# Patient Record
Sex: MALE | Race: OTHER | Employment: OTHER | URBAN - METROPOLITAN AREA
[De-identification: names, ages, dates, MRNs, and addresses within clinical notes are randomized per-mention and may not be internally consistent; named-entity substitution may affect disease eponyms.]

---

## 2017-11-16 ENCOUNTER — OFFICE VISIT (OUTPATIENT)
Dept: SURGERY | Age: 62
End: 2017-11-16

## 2017-11-16 VITALS
HEIGHT: 71 IN | DIASTOLIC BLOOD PRESSURE: 86 MMHG | SYSTOLIC BLOOD PRESSURE: 135 MMHG | WEIGHT: 220 LBS | BODY MASS INDEX: 30.8 KG/M2 | HEART RATE: 59 BPM

## 2017-11-16 DIAGNOSIS — E78.5 HYPERLIPIDEMIA, UNSPECIFIED HYPERLIPIDEMIA TYPE: ICD-10-CM

## 2017-11-16 DIAGNOSIS — E11.9 TYPE 2 DIABETES MELLITUS WITHOUT COMPLICATION, WITHOUT LONG-TERM CURRENT USE OF INSULIN (HCC): ICD-10-CM

## 2017-11-16 DIAGNOSIS — E11.42 DIABETIC POLYNEUROPATHY ASSOCIATED WITH TYPE 2 DIABETES MELLITUS (HCC): Primary | ICD-10-CM

## 2017-11-16 DIAGNOSIS — E78.00 HYPERCHOLESTEREMIA: ICD-10-CM

## 2017-11-16 LAB
GLUCOSE BLD-MCNC: 229 MG/DL
HBA1C MFR BLD: 8 %

## 2017-11-16 PROCEDURE — 99203 OFFICE O/P NEW LOW 30 MIN: CPT | Performed by: INTERNAL MEDICINE

## 2017-11-16 PROCEDURE — 83036 HEMOGLOBIN GLYCOSYLATED A1C: CPT | Performed by: INTERNAL MEDICINE

## 2017-11-16 PROCEDURE — 3017F COLORECTAL CA SCREEN DOC REV: CPT | Performed by: INTERNAL MEDICINE

## 2017-11-16 PROCEDURE — G8484 FLU IMMUNIZE NO ADMIN: HCPCS | Performed by: INTERNAL MEDICINE

## 2017-11-16 PROCEDURE — 1036F TOBACCO NON-USER: CPT | Performed by: INTERNAL MEDICINE

## 2017-11-16 PROCEDURE — 82962 GLUCOSE BLOOD TEST: CPT | Performed by: INTERNAL MEDICINE

## 2017-11-16 PROCEDURE — G8427 DOCREV CUR MEDS BY ELIG CLIN: HCPCS | Performed by: INTERNAL MEDICINE

## 2017-11-16 PROCEDURE — G8417 CALC BMI ABV UP PARAM F/U: HCPCS | Performed by: INTERNAL MEDICINE

## 2017-11-16 PROCEDURE — 3045F PR MOST RECENT HEMOGLOBIN A1C LEVEL 7.0-9.0%: CPT | Performed by: INTERNAL MEDICINE

## 2017-11-16 RX ORDER — METOPROLOL TARTRATE 100 MG/1
TABLET ORAL
Refills: 3 | COMMUNITY
Start: 2017-09-14 | End: 2018-02-16 | Stop reason: SDUPTHER

## 2017-11-16 RX ORDER — LISINOPRIL AND HYDROCHLOROTHIAZIDE 25; 20 MG/1; MG/1
TABLET ORAL
Refills: 3 | COMMUNITY
Start: 2017-09-14 | End: 2018-02-16 | Stop reason: SDUPTHER

## 2017-11-21 PROBLEM — E11.9 TYPE 2 DIABETES MELLITUS WITHOUT COMPLICATION (HCC): Status: ACTIVE | Noted: 2017-11-21

## 2017-11-21 PROBLEM — E78.5 HYPERLIPIDEMIA: Status: ACTIVE | Noted: 2017-11-21

## 2017-11-21 PROBLEM — I10 HYPERTENSION: Status: ACTIVE | Noted: 2017-11-21

## 2017-11-21 NOTE — PROGRESS NOTES
Subjective:      Patient ID: Monet Randall is a 57 y.o. male. Diabetes   He presents for his initial diabetic visit. He has type 2 diabetes mellitus. His disease course has been improving. There are no hypoglycemic associated symptoms. Associated symptoms include foot paresthesias. Pertinent negatives for diabetes include no polydipsia and no polyuria. Symptoms are improving. Diabetic complications include peripheral neuropathy. Risk factors for coronary artery disease include diabetes mellitus, obesity and male sex. Current diabetic treatment includes oral agent (monotherapy) (metformin). He is following a generally healthy diet. His overall blood glucose range is 180-200 mg/dl. (Lab Results       Component                Value               Date                       LABA1C                   8.0                 11/16/2017              ) An ACE inhibitor/angiotensin II receptor blocker is being taken. Hyperlipidemia   This is a new problem. Exacerbating diseases include diabetes. Risk factors for coronary artery disease include diabetes mellitus, dyslipidemia, obesity and male sex. Patient Active Problem List   Diagnosis    Type 2 diabetes mellitus without complication ([facility]    Hyperlipidemia    Hypertension         Social History     Social History    Marital status:      Spouse name: N/A    Number of children: N/A    Years of education: N/A     Occupational History    Not on file. Social History Main Topics    Smoking status: Former Smoker    Smokeless tobacco: Never Used    Alcohol use Not on file    Drug use: Unknown    Sexual activity: Not on file     Other Topics Concern    Not on file     Social History Narrative    No narrative on file     History reviewed. No pertinent surgical history. History reviewed. No pertinent family history.     No Known Allergies      Current Outpatient Prescriptions:     metFORMIN (GLUCOPHAGE) 1000 MG tablet, TK 1 T PO  BID WITH MEALS, Disp: ,

## 2018-02-16 ENCOUNTER — OFFICE VISIT (OUTPATIENT)
Dept: ENDOCRINOLOGY | Age: 63
End: 2018-02-16
Payer: MEDICARE

## 2018-02-16 VITALS
SYSTOLIC BLOOD PRESSURE: 172 MMHG | HEART RATE: 96 BPM | WEIGHT: 228 LBS | HEIGHT: 71 IN | BODY MASS INDEX: 31.92 KG/M2 | DIASTOLIC BLOOD PRESSURE: 106 MMHG

## 2018-02-16 DIAGNOSIS — E11.9 TYPE 2 DIABETES MELLITUS WITHOUT COMPLICATION, WITHOUT LONG-TERM CURRENT USE OF INSULIN (HCC): Primary | ICD-10-CM

## 2018-02-16 DIAGNOSIS — E11.42 DIABETIC POLYNEUROPATHY ASSOCIATED WITH TYPE 2 DIABETES MELLITUS (HCC): ICD-10-CM

## 2018-02-16 LAB
GLUCOSE BLD-MCNC: 240 MG/DL
HBA1C MFR BLD: 7.3 %

## 2018-02-16 PROCEDURE — 3017F COLORECTAL CA SCREEN DOC REV: CPT | Performed by: INTERNAL MEDICINE

## 2018-02-16 PROCEDURE — 99213 OFFICE O/P EST LOW 20 MIN: CPT | Performed by: INTERNAL MEDICINE

## 2018-02-16 PROCEDURE — 1036F TOBACCO NON-USER: CPT | Performed by: INTERNAL MEDICINE

## 2018-02-16 PROCEDURE — G8484 FLU IMMUNIZE NO ADMIN: HCPCS | Performed by: INTERNAL MEDICINE

## 2018-02-16 PROCEDURE — 3045F PR MOST RECENT HEMOGLOBIN A1C LEVEL 7.0-9.0%: CPT | Performed by: INTERNAL MEDICINE

## 2018-02-16 PROCEDURE — G8427 DOCREV CUR MEDS BY ELIG CLIN: HCPCS | Performed by: INTERNAL MEDICINE

## 2018-02-16 PROCEDURE — 83036 HEMOGLOBIN GLYCOSYLATED A1C: CPT | Performed by: INTERNAL MEDICINE

## 2018-02-16 PROCEDURE — G8417 CALC BMI ABV UP PARAM F/U: HCPCS | Performed by: INTERNAL MEDICINE

## 2018-02-16 PROCEDURE — 82962 GLUCOSE BLOOD TEST: CPT | Performed by: INTERNAL MEDICINE

## 2018-02-16 RX ORDER — METOPROLOL TARTRATE 100 MG/1
TABLET ORAL
Qty: 60 TABLET | Refills: 3 | Status: ON HOLD | OUTPATIENT
Start: 2018-02-16 | End: 2020-06-07

## 2018-02-16 RX ORDER — LISINOPRIL AND HYDROCHLOROTHIAZIDE 25; 20 MG/1; MG/1
TABLET ORAL
Qty: 30 TABLET | Refills: 3 | Status: SHIPPED | OUTPATIENT
Start: 2018-02-16 | End: 2018-09-25 | Stop reason: SDUPTHER

## 2018-06-28 ENCOUNTER — OFFICE VISIT (OUTPATIENT)
Dept: ENDOCRINOLOGY | Age: 63
End: 2018-06-28
Payer: MEDICARE

## 2018-06-28 VITALS
BODY MASS INDEX: 31.08 KG/M2 | WEIGHT: 222 LBS | SYSTOLIC BLOOD PRESSURE: 172 MMHG | DIASTOLIC BLOOD PRESSURE: 120 MMHG | HEART RATE: 98 BPM | HEIGHT: 71 IN

## 2018-06-28 DIAGNOSIS — E11.9 TYPE 2 DIABETES MELLITUS WITHOUT COMPLICATION, WITHOUT LONG-TERM CURRENT USE OF INSULIN (HCC): Primary | ICD-10-CM

## 2018-06-28 DIAGNOSIS — E11.42 DIABETIC POLYNEUROPATHY ASSOCIATED WITH TYPE 2 DIABETES MELLITUS (HCC): ICD-10-CM

## 2018-06-28 LAB
GLUCOSE BLD-MCNC: 425 MG/DL
HBA1C MFR BLD: 9.2 %

## 2018-06-28 PROCEDURE — 3046F HEMOGLOBIN A1C LEVEL >9.0%: CPT | Performed by: INTERNAL MEDICINE

## 2018-06-28 PROCEDURE — 2022F DILAT RTA XM EVC RTNOPTHY: CPT | Performed by: INTERNAL MEDICINE

## 2018-06-28 PROCEDURE — G8417 CALC BMI ABV UP PARAM F/U: HCPCS | Performed by: INTERNAL MEDICINE

## 2018-06-28 PROCEDURE — 1036F TOBACCO NON-USER: CPT | Performed by: INTERNAL MEDICINE

## 2018-06-28 PROCEDURE — G8427 DOCREV CUR MEDS BY ELIG CLIN: HCPCS | Performed by: INTERNAL MEDICINE

## 2018-06-28 PROCEDURE — 83036 HEMOGLOBIN GLYCOSYLATED A1C: CPT | Performed by: INTERNAL MEDICINE

## 2018-06-28 PROCEDURE — 3017F COLORECTAL CA SCREEN DOC REV: CPT | Performed by: INTERNAL MEDICINE

## 2018-06-28 PROCEDURE — 82962 GLUCOSE BLOOD TEST: CPT | Performed by: INTERNAL MEDICINE

## 2018-06-28 PROCEDURE — 99213 OFFICE O/P EST LOW 20 MIN: CPT | Performed by: INTERNAL MEDICINE

## 2018-06-28 RX ORDER — INSULIN LISPRO 100 [IU]/ML
INJECTION, SUSPENSION SUBCUTANEOUS
Qty: 15 PEN | Refills: 3 | Status: SHIPPED | OUTPATIENT
Start: 2018-06-28 | End: 2018-12-28 | Stop reason: SDUPTHER

## 2018-07-27 ENCOUNTER — TELEPHONE (OUTPATIENT)
Dept: ENDOCRINOLOGY | Age: 63
End: 2018-07-27

## 2018-07-27 NOTE — TELEPHONE ENCOUNTER
----- Message from Omid Wilks MD sent at 6/30/2018  5:54 PM EDT -----  Fax copy of notes to dr Denise Fisher

## 2018-09-25 DIAGNOSIS — E11.42 DIABETIC POLYNEUROPATHY ASSOCIATED WITH TYPE 2 DIABETES MELLITUS (HCC): ICD-10-CM

## 2018-09-26 RX ORDER — LISINOPRIL AND HYDROCHLOROTHIAZIDE 25; 20 MG/1; MG/1
TABLET ORAL
Qty: 30 TABLET | Refills: 0 | Status: SHIPPED | OUTPATIENT
Start: 2018-09-26 | End: 2018-09-28 | Stop reason: ALTCHOICE

## 2018-09-26 RX ORDER — LISINOPRIL AND HYDROCHLOROTHIAZIDE 25; 20 MG/1; MG/1
TABLET ORAL
Qty: 90 TABLET | Refills: 3 | Status: SHIPPED | OUTPATIENT
Start: 2018-09-26 | End: 2019-09-27 | Stop reason: SINTOL

## 2018-09-28 ENCOUNTER — OFFICE VISIT (OUTPATIENT)
Dept: ENDOCRINOLOGY | Age: 63
End: 2018-09-28
Payer: MEDICARE

## 2018-09-28 VITALS
BODY MASS INDEX: 31.92 KG/M2 | WEIGHT: 228 LBS | HEIGHT: 71 IN | DIASTOLIC BLOOD PRESSURE: 95 MMHG | HEART RATE: 75 BPM | SYSTOLIC BLOOD PRESSURE: 150 MMHG

## 2018-09-28 DIAGNOSIS — E11.21 DIABETIC NEPHROPATHY ASSOCIATED WITH TYPE 2 DIABETES MELLITUS (HCC): ICD-10-CM

## 2018-09-28 DIAGNOSIS — E11.9 TYPE 2 DIABETES MELLITUS WITHOUT COMPLICATION, WITHOUT LONG-TERM CURRENT USE OF INSULIN (HCC): ICD-10-CM

## 2018-09-28 DIAGNOSIS — E11.9 TYPE 2 DIABETES MELLITUS WITHOUT COMPLICATION, WITHOUT LONG-TERM CURRENT USE OF INSULIN (HCC): Primary | ICD-10-CM

## 2018-09-28 LAB
ANION GAP SERPL CALCULATED.3IONS-SCNC: 19 MEQ/L (ref 7–13)
BUN BLDV-MCNC: 22 MG/DL (ref 8–23)
CALCIUM SERPL-MCNC: 9.5 MG/DL (ref 8.6–10.2)
CHLORIDE BLD-SCNC: 100 MEQ/L (ref 98–107)
CHOLESTEROL, TOTAL: 158 MG/DL (ref 0–199)
CO2: 22 MEQ/L (ref 22–29)
CREAT SERPL-MCNC: 1.07 MG/DL (ref 0.7–1.2)
CREATININE URINE: 112.3 MG/DL
GFR AFRICAN AMERICAN: >60
GFR NON-AFRICAN AMERICAN: >60
GLUCOSE BLD-MCNC: 158 MG/DL (ref 74–109)
GLUCOSE BLD-MCNC: 165 MG/DL
HBA1C MFR BLD: 10.5 % (ref 4.8–5.9)
HBA1C MFR BLD: 9.4 %
HDLC SERPL-MCNC: 42 MG/DL (ref 40–59)
LDL CHOLESTEROL CALCULATED: 84 MG/DL (ref 0–129)
MICROALBUMIN UR-MCNC: 22.1 MG/DL
MICROALBUMIN/CREAT UR-RTO: 196.8 MG/G (ref 0–30)
POTASSIUM SERPL-SCNC: 3.7 MEQ/L (ref 3.5–5.1)
SODIUM BLD-SCNC: 141 MEQ/L (ref 132–144)
TRIGL SERPL-MCNC: 158 MG/DL (ref 0–200)

## 2018-09-28 PROCEDURE — 3046F HEMOGLOBIN A1C LEVEL >9.0%: CPT | Performed by: INTERNAL MEDICINE

## 2018-09-28 PROCEDURE — 2022F DILAT RTA XM EVC RTNOPTHY: CPT | Performed by: INTERNAL MEDICINE

## 2018-09-28 PROCEDURE — 82962 GLUCOSE BLOOD TEST: CPT | Performed by: INTERNAL MEDICINE

## 2018-09-28 PROCEDURE — 99213 OFFICE O/P EST LOW 20 MIN: CPT | Performed by: INTERNAL MEDICINE

## 2018-09-28 PROCEDURE — 3017F COLORECTAL CA SCREEN DOC REV: CPT | Performed by: INTERNAL MEDICINE

## 2018-09-28 PROCEDURE — G8417 CALC BMI ABV UP PARAM F/U: HCPCS | Performed by: INTERNAL MEDICINE

## 2018-09-28 PROCEDURE — G8427 DOCREV CUR MEDS BY ELIG CLIN: HCPCS | Performed by: INTERNAL MEDICINE

## 2018-09-28 PROCEDURE — 1036F TOBACCO NON-USER: CPT | Performed by: INTERNAL MEDICINE

## 2018-09-28 PROCEDURE — 83036 HEMOGLOBIN GLYCOSYLATED A1C: CPT | Performed by: INTERNAL MEDICINE

## 2018-10-01 NOTE — PROGRESS NOTES
Height: 5' 11\" (1.803 m)       Objective:   Physical Exam   Constitutional: He appears well-developed and well-nourished. HENT:   Head: Atraumatic. Eyes: Conjunctivae are normal.   Neck: Neck supple. Cardiovascular: Normal rate. Pulmonary/Chest: Effort normal.   Musculoskeletal: Normal range of motion. Neurological: He is alert. Psychiatric: He has a normal mood and affect. Results for Berenice Rodriguez (MRN [id number]) as of 10/1/2018 16:41   Ref. Range 9/28/2018 14:44   Sodium Latest Ref Range: 132 - 144 mEq/L 141   Potassium Latest Ref Range: 3.5 - 5.1 mEq/L 3.7   Chloride Latest Ref Range: 98 - 107 mEq/L 100   CO2 Latest Ref Range: 22 - 29 mEq/L 22   BUN Latest Ref Range: 8 - 23 mg/dL 22   Creatinine Latest Ref Range: 0.70 - 1.20 mg/dL 1.07   Anion Gap Latest Ref Range: 7 - 13 mEq/L 19 (H)   GFR Non- Latest Ref Range: >60  >60.0   GFR African American Latest Ref Range: >60  >60.0   Glucose Latest Ref Range: 74 - 109 mg/dL 158 (H)   Calcium Latest Ref Range: 8.6 - 10.2 mg/dL 9.5   Cholesterol, Total Latest Ref Range: 0 - 199 mg/dL 158   HDL Cholesterol Latest Ref Range: 40 - 59 mg/dL 42   LDL Calculated Latest Ref Range: 0 - 129 mg/dL 84   Triglycerides Latest Ref Range: 0 - 200 mg/dL 158   Hemoglobin A1C Latest Ref Range: 4.8 - 5.9 % 10.5 (H)   Creatinine, Ur Latest Ref Range: Not Established mg/dL 112.3   Microalbumin Creatinine Ratio Latest Ref Range: 0.0 - 30.0 mg/G 196.8 (H)   Microalbumin, Random Urine Latest Ref Range: Not Established mg/dL 22.10 (H)     Assessment:       Diagnosis Orders   1. Type 2 diabetes mellitus without complication, without long-term current use of insulin ([facility])  POCT Glucose    POCT glycosylated hemoglobin (Hb A1C)    Basic Metabolic Panel    Hemoglobin A1C    Lipid Panel    Microalbumin / Creatinine Urine Ratio   2.  Diabetic nephropathy associated with type 2 diabetes mellitus ([facility]             Plan:      Orders Placed This Encounter   Procedures    Basic Metabolic Panel     Standing Status:   Future     Number of Occurrences:   1     Standing Expiration Date:   9/28/2019    Hemoglobin A1C     Standing Status:   Future     Number of Occurrences:   1     Standing Expiration Date:   9/28/2019    Lipid Panel     Standing Status:   Future     Number of Occurrences:   1     Standing Expiration Date:   9/28/2019     Order Specific Question:   Is Patient Fasting?/# of Hours     Answer:   8    Microalbumin / Creatinine Urine Ratio     Standing Status:   Future     Number of Occurrences:   1     Standing Expiration Date:   9/28/2019    POCT Glucose    POCT glycosylated hemoglobin (Hb A1C)     Orders Placed This Encounter   Medications    Multiple Vitamin (MVI, CELEBRATE, CHEWABLE TABLET)     Sig: Take 1 tablet by mouth daily     Dispense:  30 tablet     Refill:  06     Continue metformin thousand milligrams twice daily  Continue Humalog 75/25 20 units twice a day compliance stressed with diet A1c goal of 7 or lower        Tatianna Kessler MD

## 2018-12-28 ENCOUNTER — OFFICE VISIT (OUTPATIENT)
Dept: ENDOCRINOLOGY | Age: 63
End: 2018-12-28
Payer: MEDICARE

## 2018-12-28 VITALS
SYSTOLIC BLOOD PRESSURE: 152 MMHG | DIASTOLIC BLOOD PRESSURE: 98 MMHG | HEART RATE: 86 BPM | BODY MASS INDEX: 31.78 KG/M2 | WEIGHT: 227 LBS | HEIGHT: 71 IN

## 2018-12-28 DIAGNOSIS — Z23 ENCOUNTER FOR IMMUNIZATION: ICD-10-CM

## 2018-12-28 DIAGNOSIS — E11.9 TYPE 2 DIABETES MELLITUS WITHOUT COMPLICATION, WITHOUT LONG-TERM CURRENT USE OF INSULIN (HCC): Primary | ICD-10-CM

## 2018-12-28 DIAGNOSIS — E11.42 DIABETIC POLYNEUROPATHY ASSOCIATED WITH TYPE 2 DIABETES MELLITUS (HCC): ICD-10-CM

## 2018-12-28 LAB
GLUCOSE BLD-MCNC: 292 MG/DL
HBA1C MFR BLD: 10.1 %

## 2018-12-28 PROCEDURE — 2022F DILAT RTA XM EVC RTNOPTHY: CPT | Performed by: INTERNAL MEDICINE

## 2018-12-28 PROCEDURE — G0008 ADMIN INFLUENZA VIRUS VAC: HCPCS | Performed by: INTERNAL MEDICINE

## 2018-12-28 PROCEDURE — 82962 GLUCOSE BLOOD TEST: CPT | Performed by: INTERNAL MEDICINE

## 2018-12-28 PROCEDURE — 99213 OFFICE O/P EST LOW 20 MIN: CPT | Performed by: INTERNAL MEDICINE

## 2018-12-28 PROCEDURE — G8482 FLU IMMUNIZE ORDER/ADMIN: HCPCS | Performed by: INTERNAL MEDICINE

## 2018-12-28 PROCEDURE — G8417 CALC BMI ABV UP PARAM F/U: HCPCS | Performed by: INTERNAL MEDICINE

## 2018-12-28 PROCEDURE — 83036 HEMOGLOBIN GLYCOSYLATED A1C: CPT | Performed by: INTERNAL MEDICINE

## 2018-12-28 PROCEDURE — 3046F HEMOGLOBIN A1C LEVEL >9.0%: CPT | Performed by: INTERNAL MEDICINE

## 2018-12-28 PROCEDURE — 90688 IIV4 VACCINE SPLT 0.5 ML IM: CPT | Performed by: INTERNAL MEDICINE

## 2018-12-28 PROCEDURE — 1036F TOBACCO NON-USER: CPT | Performed by: INTERNAL MEDICINE

## 2018-12-28 PROCEDURE — 3017F COLORECTAL CA SCREEN DOC REV: CPT | Performed by: INTERNAL MEDICINE

## 2018-12-28 PROCEDURE — G8427 DOCREV CUR MEDS BY ELIG CLIN: HCPCS | Performed by: INTERNAL MEDICINE

## 2018-12-28 RX ORDER — INSULIN LISPRO 100 [IU]/ML
INJECTION, SUSPENSION SUBCUTANEOUS
Qty: 15 PEN | Refills: 3 | Status: SHIPPED | OUTPATIENT
Start: 2018-12-28 | End: 2019-06-27 | Stop reason: SDUPTHER

## 2019-03-28 ENCOUNTER — OFFICE VISIT (OUTPATIENT)
Dept: ENDOCRINOLOGY | Age: 64
End: 2019-03-28
Payer: MEDICARE

## 2019-03-28 VITALS
HEIGHT: 71 IN | DIASTOLIC BLOOD PRESSURE: 112 MMHG | BODY MASS INDEX: 32.48 KG/M2 | HEART RATE: 85 BPM | SYSTOLIC BLOOD PRESSURE: 185 MMHG | WEIGHT: 232 LBS

## 2019-03-28 DIAGNOSIS — E11.9 TYPE 2 DIABETES MELLITUS WITHOUT COMPLICATION, WITHOUT LONG-TERM CURRENT USE OF INSULIN (HCC): Primary | ICD-10-CM

## 2019-03-28 LAB
GLUCOSE BLD-MCNC: 298 MG/DL
HBA1C MFR BLD: 9.5 %

## 2019-03-28 PROCEDURE — 99213 OFFICE O/P EST LOW 20 MIN: CPT | Performed by: INTERNAL MEDICINE

## 2019-03-28 PROCEDURE — G8427 DOCREV CUR MEDS BY ELIG CLIN: HCPCS | Performed by: INTERNAL MEDICINE

## 2019-03-28 PROCEDURE — 82962 GLUCOSE BLOOD TEST: CPT | Performed by: INTERNAL MEDICINE

## 2019-03-28 PROCEDURE — G8417 CALC BMI ABV UP PARAM F/U: HCPCS | Performed by: INTERNAL MEDICINE

## 2019-03-28 PROCEDURE — 3017F COLORECTAL CA SCREEN DOC REV: CPT | Performed by: INTERNAL MEDICINE

## 2019-03-28 PROCEDURE — 83036 HEMOGLOBIN GLYCOSYLATED A1C: CPT | Performed by: INTERNAL MEDICINE

## 2019-03-28 PROCEDURE — G8482 FLU IMMUNIZE ORDER/ADMIN: HCPCS | Performed by: INTERNAL MEDICINE

## 2019-03-28 PROCEDURE — 2022F DILAT RTA XM EVC RTNOPTHY: CPT | Performed by: INTERNAL MEDICINE

## 2019-03-28 PROCEDURE — 1036F TOBACCO NON-USER: CPT | Performed by: INTERNAL MEDICINE

## 2019-03-28 PROCEDURE — 3046F HEMOGLOBIN A1C LEVEL >9.0%: CPT | Performed by: INTERNAL MEDICINE

## 2019-06-27 ENCOUNTER — OFFICE VISIT (OUTPATIENT)
Dept: ENDOCRINOLOGY | Age: 64
End: 2019-06-27
Payer: MEDICARE

## 2019-06-27 VITALS
DIASTOLIC BLOOD PRESSURE: 106 MMHG | HEART RATE: 85 BPM | WEIGHT: 229 LBS | SYSTOLIC BLOOD PRESSURE: 180 MMHG | BODY MASS INDEX: 32.06 KG/M2 | HEIGHT: 71 IN

## 2019-06-27 DIAGNOSIS — E11.9 TYPE 2 DIABETES MELLITUS WITHOUT COMPLICATION, WITHOUT LONG-TERM CURRENT USE OF INSULIN (HCC): Primary | ICD-10-CM

## 2019-06-27 DIAGNOSIS — I10 ESSENTIAL HYPERTENSION: ICD-10-CM

## 2019-06-27 LAB
CHP ED QC CHECK: NORMAL
GLUCOSE BLD-MCNC: 113 MG/DL
HBA1C MFR BLD: 8.2 %

## 2019-06-27 PROCEDURE — 82962 GLUCOSE BLOOD TEST: CPT | Performed by: INTERNAL MEDICINE

## 2019-06-27 PROCEDURE — 1036F TOBACCO NON-USER: CPT | Performed by: INTERNAL MEDICINE

## 2019-06-27 PROCEDURE — 3045F PR MOST RECENT HEMOGLOBIN A1C LEVEL 7.0-9.0%: CPT | Performed by: INTERNAL MEDICINE

## 2019-06-27 PROCEDURE — G8417 CALC BMI ABV UP PARAM F/U: HCPCS | Performed by: INTERNAL MEDICINE

## 2019-06-27 PROCEDURE — 3017F COLORECTAL CA SCREEN DOC REV: CPT | Performed by: INTERNAL MEDICINE

## 2019-06-27 PROCEDURE — 83036 HEMOGLOBIN GLYCOSYLATED A1C: CPT | Performed by: INTERNAL MEDICINE

## 2019-06-27 PROCEDURE — G8427 DOCREV CUR MEDS BY ELIG CLIN: HCPCS | Performed by: INTERNAL MEDICINE

## 2019-06-27 PROCEDURE — 2022F DILAT RTA XM EVC RTNOPTHY: CPT | Performed by: INTERNAL MEDICINE

## 2019-06-27 PROCEDURE — 99213 OFFICE O/P EST LOW 20 MIN: CPT | Performed by: INTERNAL MEDICINE

## 2019-06-27 RX ORDER — INSULIN LISPRO 100 [IU]/ML
INJECTION, SUSPENSION SUBCUTANEOUS
Qty: 15 PEN | Refills: 3 | Status: SHIPPED | OUTPATIENT
Start: 2019-06-27 | End: 2019-09-27 | Stop reason: SDUPTHER

## 2019-06-27 RX ORDER — AMLODIPINE BESYLATE 5 MG/1
5 TABLET ORAL DAILY
Qty: 30 TABLET | Refills: 3 | Status: SHIPPED | OUTPATIENT
Start: 2019-06-27 | End: 2019-09-15 | Stop reason: SDUPTHER

## 2019-07-01 NOTE — PROGRESS NOTES
Subjective:      Patient ID: Gala Valladares is a 58 y.o. male. 3 month f/u on diabetes   Diabetes   He presents for his follow-up diabetic visit. He has type 2 diabetes mellitus. Symptoms are improving. Current diabetic treatment includes insulin injections (75/25 humalog plus metformin ). His overall blood glucose range is 180-200 mg/dl. An ACE inhibitor/angiotensin II receptor blocker is being taken. Hypertension not well controlled on lisnopril 20/25 hctz     Results for Tres Perez (MRN [id number]) as of 6/30/2019 21:49   Ref. Range 12/28/2018 12:20 3/28/2019 11:58 3/28/2019 12:06 6/27/2019 13:48 6/27/2019 13:57   Hemoglobin A1C Latest Units: % 10.1  9.5  8.2         Patient Active Problem List   Diagnosis    Type 2 diabetes mellitus without complication (HCC)    Hyperlipidemia    Hypertension     No Known Allergies      Current Outpatient Medications:     amLODIPine (NORVASC) 5 MG tablet, Take 1 tablet by mouth daily, Disp: 30 tablet, Rfl: 03    insulin lispro protamine & lispro (HUMALOG MIX 75/25 KWIKPEN) (75-25) 100 UNIT per ML SUPN injection pen, 35 units twice  Day, Disp: 15 pen, Rfl: 3    Insulin Pen Needle (NOVOFINE) 32G X 6 MM MISC, Bid, Disp: 100 each, Rfl: 3    metFORMIN (GLUCOPHAGE) 1000 MG tablet, TAKE 1 TABLET BY MOUTH TWICE DAILY WITH MEALS, Disp: 180 tablet, Rfl: 3    Multiple Vitamin (MVI, CELEBRATE, CHEWABLE TABLET), Take 1 tablet by mouth daily, Disp: 30 tablet, Rfl: 06    lisinopril-hydrochlorothiazide (PRINZIDE;ZESTORETIC) 20-25 MG per tablet, TAKE 1 TABLET BY MOUTH EVERY DAY, Disp: 90 tablet, Rfl: 3    metoprolol (LOPRESSOR) 100 MG tablet, TK 1 T PO  D, Disp: 60 tablet, Rfl: 3    Review of Systems    Vitals:    06/27/19 1344   BP: (!) 180/106   Site: Right Upper Arm   Position: Sitting   Cuff Size: Large Adult   Pulse: 85   Weight: 229 lb (103.9 kg)   Height: 5' 11\" (1.803 m)       Objective:   Physical Exam   Constitutional: He appears well-developed and well-nourished.    HENT: Head: Normocephalic and atraumatic. Neck: Neck supple. Cardiovascular: Normal rate. Musculoskeletal: Normal range of motion. Neurological: He is alert. Psychiatric: He has a normal mood and affect. Assessment:       Diagnosis Orders   1. Type 2 diabetes mellitus without complication, without long-term current use of insulin ([facility])  POCT Glucose    POCT glycosylated hemoglobin (Hb A1C)    Basic Metabolic Panel    Microalbumin / Creatinine Urine Ratio    insulin lispro protamine & lispro (HUMALOG MIX 75/25 KWIKPEN) (75-25) 100 UNIT per ML SUPN injection pen   2.  Essential hypertension             Plan:      Orders Placed This Encounter   Procedures    Basic Metabolic Panel     Standing Status:   Future     Standing Expiration Date:   2020    Microalbumin / Creatinine Urine Ratio     Standing Status:   Future     Standing Expiration Date:   2020    POCT Glucose    POCT glycosylated hemoglobin (Hb A1C)     Add norvasc to lisnopril 20/25 hctz   Orders Placed This Encounter   Medications    amLODIPine (NORVASC) 5 MG tablet     Sig: Take 1 tablet by mouth daily     Dispense:  30 tablet     Refill:  03    insulin lispro protamine & lispro (HUMALOG MIX 75/25 KWIKPEN) (75-25) 100 UNIT per ML SUPN injection pen     Si units twice  Day     Dispense:  15 pen     Refill:  3    Insulin Pen Needle (NOVOFINE) 32G X 6 MM MISC     Sig: Bid     Dispense:  100 each     Refill:  3     Orders Placed This Encounter   Procedures    Basic Metabolic Panel     Standing Status:   Future     Standing Expiration Date:   2020    Microalbumin / Creatinine Urine Ratio     Standing Status:   Future     Standing Expiration Date:   2020    POCT Glucose    POCT glycosylated hemoglobin (Hb A1C)             Neville Vasquez MD

## 2019-09-18 RX ORDER — AMLODIPINE BESYLATE 5 MG/1
5 TABLET ORAL DAILY
Qty: 90 TABLET | Refills: 3 | Status: ON HOLD | OUTPATIENT
Start: 2019-09-18 | End: 2020-06-09 | Stop reason: HOSPADM

## 2019-09-27 ENCOUNTER — OFFICE VISIT (OUTPATIENT)
Dept: ENDOCRINOLOGY | Age: 64
End: 2019-09-27
Payer: MEDICARE

## 2019-09-27 VITALS
WEIGHT: 232 LBS | DIASTOLIC BLOOD PRESSURE: 87 MMHG | SYSTOLIC BLOOD PRESSURE: 171 MMHG | HEIGHT: 71 IN | BODY MASS INDEX: 32.48 KG/M2

## 2019-09-27 DIAGNOSIS — E11.42 DIABETIC POLYNEUROPATHY ASSOCIATED WITH TYPE 2 DIABETES MELLITUS (HCC): ICD-10-CM

## 2019-09-27 DIAGNOSIS — Z23 ENCOUNTER FOR IMMUNIZATION: ICD-10-CM

## 2019-09-27 DIAGNOSIS — E11.9 TYPE 2 DIABETES MELLITUS WITHOUT COMPLICATION, WITHOUT LONG-TERM CURRENT USE OF INSULIN (HCC): Primary | ICD-10-CM

## 2019-09-27 LAB
CHP ED QC CHECK: NORMAL
GLUCOSE BLD-MCNC: 311 MG/DL
HBA1C MFR BLD: 9 %

## 2019-09-27 PROCEDURE — 3045F PR MOST RECENT HEMOGLOBIN A1C LEVEL 7.0-9.0%: CPT | Performed by: INTERNAL MEDICINE

## 2019-09-27 PROCEDURE — G0008 ADMIN INFLUENZA VIRUS VAC: HCPCS | Performed by: INTERNAL MEDICINE

## 2019-09-27 PROCEDURE — 99213 OFFICE O/P EST LOW 20 MIN: CPT | Performed by: INTERNAL MEDICINE

## 2019-09-27 PROCEDURE — 83036 HEMOGLOBIN GLYCOSYLATED A1C: CPT | Performed by: INTERNAL MEDICINE

## 2019-09-27 PROCEDURE — G8427 DOCREV CUR MEDS BY ELIG CLIN: HCPCS | Performed by: INTERNAL MEDICINE

## 2019-09-27 PROCEDURE — G8417 CALC BMI ABV UP PARAM F/U: HCPCS | Performed by: INTERNAL MEDICINE

## 2019-09-27 PROCEDURE — 3017F COLORECTAL CA SCREEN DOC REV: CPT | Performed by: INTERNAL MEDICINE

## 2019-09-27 PROCEDURE — 2022F DILAT RTA XM EVC RTNOPTHY: CPT | Performed by: INTERNAL MEDICINE

## 2019-09-27 PROCEDURE — 82962 GLUCOSE BLOOD TEST: CPT | Performed by: INTERNAL MEDICINE

## 2019-09-27 PROCEDURE — 90682 RIV4 VACC RECOMBINANT DNA IM: CPT | Performed by: INTERNAL MEDICINE

## 2019-09-27 PROCEDURE — 1036F TOBACCO NON-USER: CPT | Performed by: INTERNAL MEDICINE

## 2019-09-27 RX ORDER — CHLORTHALIDONE 25 MG/1
25 TABLET ORAL DAILY
Refills: 1 | Status: ON HOLD | COMMUNITY
Start: 2019-09-11 | End: 2020-06-07

## 2019-09-27 RX ORDER — ATORVASTATIN CALCIUM 20 MG/1
20 TABLET, FILM COATED ORAL DAILY
Refills: 1 | Status: ON HOLD | COMMUNITY
Start: 2019-09-11 | End: 2020-06-07

## 2019-09-27 RX ORDER — INSULIN LISPRO 100 [IU]/ML
INJECTION, SUSPENSION SUBCUTANEOUS
Qty: 15 PEN | Refills: 3 | Status: SHIPPED | OUTPATIENT
Start: 2019-09-27 | End: 2020-04-10 | Stop reason: SDUPTHER

## 2019-10-25 RX ORDER — AMLODIPINE BESYLATE 5 MG/1
5 TABLET ORAL DAILY
Qty: 30 TABLET | Refills: 3 | Status: SHIPPED | OUTPATIENT
Start: 2019-10-25 | End: 2019-10-25 | Stop reason: SDUPTHER

## 2019-10-26 RX ORDER — AMLODIPINE BESYLATE 5 MG/1
5 TABLET ORAL DAILY
Qty: 90 TABLET | Refills: 3 | Status: SHIPPED | OUTPATIENT
Start: 2019-10-26 | End: 2022-03-28 | Stop reason: SDUPTHER

## 2020-01-20 ENCOUNTER — OFFICE VISIT (OUTPATIENT)
Dept: ENDOCRINOLOGY | Age: 65
End: 2020-01-20
Payer: MEDICARE

## 2020-01-20 VITALS
HEIGHT: 71 IN | SYSTOLIC BLOOD PRESSURE: 195 MMHG | WEIGHT: 235 LBS | BODY MASS INDEX: 32.9 KG/M2 | HEART RATE: 83 BPM | TEMPERATURE: 99.2 F | DIASTOLIC BLOOD PRESSURE: 109 MMHG | OXYGEN SATURATION: 96 %

## 2020-01-20 DIAGNOSIS — E11.9 TYPE 2 DIABETES MELLITUS WITHOUT COMPLICATION, WITHOUT LONG-TERM CURRENT USE OF INSULIN (HCC): ICD-10-CM

## 2020-01-20 LAB
ANION GAP SERPL CALCULATED.3IONS-SCNC: 19 MEQ/L (ref 9–15)
BUN BLDV-MCNC: 24 MG/DL (ref 8–23)
CALCIUM SERPL-MCNC: 9.8 MG/DL (ref 8.5–9.9)
CHLORIDE BLD-SCNC: 91 MEQ/L (ref 95–107)
CO2: 27 MEQ/L (ref 20–31)
CREAT SERPL-MCNC: 1.16 MG/DL (ref 0.7–1.2)
GFR AFRICAN AMERICAN: >60
GFR NON-AFRICAN AMERICAN: >60
GLUCOSE BLD-MCNC: 296 MG/DL (ref 70–99)
HBA1C MFR BLD: 10.7 % (ref 4.8–5.9)
HBA1C MFR BLD: 11.6 %
POTASSIUM SERPL-SCNC: 3.8 MEQ/L (ref 3.4–4.9)
SODIUM BLD-SCNC: 137 MEQ/L (ref 135–144)

## 2020-01-20 PROCEDURE — 1036F TOBACCO NON-USER: CPT | Performed by: INTERNAL MEDICINE

## 2020-01-20 PROCEDURE — G8427 DOCREV CUR MEDS BY ELIG CLIN: HCPCS | Performed by: INTERNAL MEDICINE

## 2020-01-20 PROCEDURE — 3046F HEMOGLOBIN A1C LEVEL >9.0%: CPT | Performed by: INTERNAL MEDICINE

## 2020-01-20 PROCEDURE — 4040F PNEUMOC VAC/ADMIN/RCVD: CPT | Performed by: INTERNAL MEDICINE

## 2020-01-20 PROCEDURE — 1123F ACP DISCUSS/DSCN MKR DOCD: CPT | Performed by: INTERNAL MEDICINE

## 2020-01-20 PROCEDURE — 3017F COLORECTAL CA SCREEN DOC REV: CPT | Performed by: INTERNAL MEDICINE

## 2020-01-20 PROCEDURE — 2022F DILAT RTA XM EVC RTNOPTHY: CPT | Performed by: INTERNAL MEDICINE

## 2020-01-20 PROCEDURE — 99213 OFFICE O/P EST LOW 20 MIN: CPT | Performed by: INTERNAL MEDICINE

## 2020-01-20 PROCEDURE — G8482 FLU IMMUNIZE ORDER/ADMIN: HCPCS | Performed by: INTERNAL MEDICINE

## 2020-01-20 PROCEDURE — G8417 CALC BMI ABV UP PARAM F/U: HCPCS | Performed by: INTERNAL MEDICINE

## 2020-01-20 PROCEDURE — 83036 HEMOGLOBIN GLYCOSYLATED A1C: CPT | Performed by: INTERNAL MEDICINE

## 2020-01-20 RX ORDER — BLOOD-GLUCOSE METER
EACH MISCELLANEOUS
COMMUNITY
Start: 2019-10-30

## 2020-01-20 RX ORDER — UBIQUINOL 100 MG
CAPSULE ORAL
COMMUNITY
Start: 2019-12-07

## 2020-01-20 RX ORDER — LANCETS 30 GAUGE
EACH MISCELLANEOUS
COMMUNITY
Start: 2019-12-07

## 2020-01-27 NOTE — PROGRESS NOTES
Appearance: Normal appearance. He is obese. HENT:      Head: Normocephalic and atraumatic. Neck:      Musculoskeletal: Normal range of motion and neck supple. Cardiovascular:      Rate and Rhythm: Normal rate. Musculoskeletal: Normal range of motion. Neurological:      Mental Status: He is alert. Psychiatric:         Mood and Affect: Mood normal.         Assessment:       Diagnosis Orders   1.  Type 2 diabetes mellitus without complication, without long-term current use of insulin ([facility])  POCT glycosylated hemoglobin (Hb A1C)    Basic Metabolic Panel    Hemoglobin A1C           Plan:      Orders Placed This Encounter   Procedures    Basic Metabolic Panel     Standing Status:   Future     Number of Occurrences:   1     Standing Expiration Date:   1/20/2021    Hemoglobin A1C     Standing Status:   Future     Number of Occurrences:   1     Standing Expiration Date:   1/20/2021    POCT glycosylated hemoglobin (Hb A1C)   Continue Humalog 75/25 45 units twice daily plus metformin thousand milligrams twice daily A1c goal of 7 or lower          Gene Romo MD

## 2020-04-10 ENCOUNTER — VIRTUAL VISIT (OUTPATIENT)
Dept: ENDOCRINOLOGY | Age: 65
End: 2020-04-10
Payer: MEDICARE

## 2020-04-10 PROCEDURE — 3017F COLORECTAL CA SCREEN DOC REV: CPT | Performed by: INTERNAL MEDICINE

## 2020-04-10 PROCEDURE — 4040F PNEUMOC VAC/ADMIN/RCVD: CPT | Performed by: INTERNAL MEDICINE

## 2020-04-10 PROCEDURE — 3046F HEMOGLOBIN A1C LEVEL >9.0%: CPT | Performed by: INTERNAL MEDICINE

## 2020-04-10 PROCEDURE — 99213 OFFICE O/P EST LOW 20 MIN: CPT | Performed by: INTERNAL MEDICINE

## 2020-04-10 PROCEDURE — 1123F ACP DISCUSS/DSCN MKR DOCD: CPT | Performed by: INTERNAL MEDICINE

## 2020-04-10 PROCEDURE — 2022F DILAT RTA XM EVC RTNOPTHY: CPT | Performed by: INTERNAL MEDICINE

## 2020-04-10 PROCEDURE — G8428 CUR MEDS NOT DOCUMENT: HCPCS | Performed by: INTERNAL MEDICINE

## 2020-04-10 RX ORDER — INSULIN LISPRO 100 [IU]/ML
INJECTION, SUSPENSION SUBCUTANEOUS
Qty: 15 PEN | Refills: 3 | Status: SHIPPED | OUTPATIENT
Start: 2020-04-10 | End: 2021-04-29 | Stop reason: SDUPTHER

## 2020-04-10 NOTE — PROGRESS NOTES
4/10/2020    TELEHEALTH EVALUATION -- Audio/Visual (During VHCWT-11 public health emergency)    Due to [place] 19 outbreak, patient's office visit was converted to a virtual visit. Patient was contacted and agreed to proceed with a virtual visit via [email or web address]. me  The risks and benefits of converting to a virtual visit were discussed in light of the current infectious disease epidemic. Patient also understood that insurance coverage and co-pays are up to their individual insurance plans. HPI:pt made aware this is a video visit billable visit agreed to proceed pt at home I was at my office     Sharin Scheuermann (:  1955) has requested an audio/video evaluation for the following concern(s):    Type 2 diabetes stable no major hypoglycemia testing intermittently on 75/25 humalog 45 units bid plus metformin 1000 mg bid glucose levels in 200 range       Results for Mary Romero (MRN [id number]) as of 2020 09:32   Ref. Range 2019 15:07 2019 15:17 2020 11:26 2020 12:26   Hemoglobin A1C Latest Ref Range: 4.8 - 5.9 %  9.0 11.6 10.7 (H)     Patient Active Problem List   Diagnosis    Type 2 diabetes mellitus without complication (HCC)    Hyperlipidemia    Hypertension         Review of Systems    Prior to Visit Medications    Medication Sig Taking?  Authorizing Provider   Insulin Pen Needle (Ophelia Hannah) 32G X 6 MM Cristi Aguero MD   aspirin 81 MG tablet Take by mouth  Historical Provider, MD   Alcohol Swabs (ALCOHOL PREP) 70 % PADS   Historical Provider, MD   Blood Glucose Monitoring Suppl (ONE TOUCH ULTRA 2) w/Device KIT   Historical Provider, MD   ONE TOUCH ULTRA TEST strip   Historical Provider, MD Aric Dominguez 30G [address] [facility]   Historical Provider, MD   amLODIPine (NORVASC) 5 MG tablet TAKE 1 TABLET BY MOUTH DAILY  Suhail Larson MD   atorvastatin (LIPITOR) 20 MG tablet Take 20 mg by mouth daily  Historical Provider, MD   chlorthalidone (HYGROTON) 25 MG tablet Take 25 mg by mouth daily Historical Provider, MD   insulin lispro protamine & lispro (HUMALOG MIX 75/25 KWIKPEN) (75-25) 100 UNIT per ML SUPN injection pen 45 units twice  Day  Angel Castillo MD   metFORMIN (GLUCOPHAGE) 1000 MG tablet TAKE 1 TABLET BY MOUTH TWICE DAILY WITH MEALS  Angel Castillo MD   amLODIPine (NORVASC) 5 MG tablet TAKE 1 TABLET BY MOUTH DAILY  Suhail Rossi MD   Multiple Vitamin (MVI, CELEBRATE, CHEWABLE TABLET) Take 1 tablet by mouth daily  Angel Castillo MD   metoprolol (LOPRESSOR) 100 MG tablet TK 1 T PO  D  Angel Castillo MD       Social History     Tobacco Use    Smoking status: Never Smoker    Smokeless tobacco: Never Used   Substance Use Topics    Alcohol use: Not on file    Drug use: Not on file            PHYSICAL EXAMINATION:  [ INSTRUCTIONS:  \"[x]\" Indicates a positive item  \"[]\" Indicates a negative item  -- DELETE ALL ITEMS NOT EXAMINED]  [] Alert  [] Oriented to person/place/time    [] No apparent distress  [] Toxic appearing    [] Face flushed appearing [] Sclera clear  [] Lips are cyanotic      [] Breathing appears normal  [] Appears tachypneic      [] Rash on visible skin    [] Cranial Nerves II-XII grossly intact    [] Motor grossly intact in visible upper extremities    [] Motor grossly intact in visible lower extremities    [] Normal Mood  [] Anxious appearing    [] Depressed appearing  [] Confused appearing      [] Poor short term memory  [] Poor long term memory    [] OTHER:      Due to this being a TeleHealth encounter, evaluation of the following organ systems is limited: Vitals/Constitutional/EENT/Resp/CV/GI//MS/Neuro/Skin/Heme-Lymph-Imm. ASSESSMENT/PLAN:     Diagnosis Orders   1. Type 2 diabetes mellitus without complication, without long-term current use of insulin (HCC)  Basic Metabolic Panel    Hemoglobin A1C    insulin lispro protamine & lispro (HUMALOG MIX 75/25 KWIKPEN) (75-25) 100 UNIT per ML SUPN injection pen   2.  Diabetic polyneuropathy associated with type 2 diabetes mellitus ([facility]

## 2020-06-07 ENCOUNTER — HOSPITAL ENCOUNTER (INPATIENT)
Age: 65
LOS: 2 days | Discharge: HOME | DRG: 282 | End: 2020-06-09
Attending: INTERNAL MEDICINE | Admitting: INTERNAL MEDICINE
Payer: MEDICARE

## 2020-06-07 ENCOUNTER — APPOINTMENT (OUTPATIENT)
Dept: GENERAL RADIOLOGY | Age: 65
DRG: 282 | End: 2020-06-07
Payer: MEDICARE

## 2020-06-07 PROBLEM — R79.89 ELEVATED TROPONIN: Status: ACTIVE | Noted: 2020-06-07

## 2020-06-07 PROBLEM — R77.8 ELEVATED TROPONIN: Status: ACTIVE | Noted: 2020-06-07

## 2020-06-07 LAB
ALBUMIN SERPL-MCNC: 4.3 G/DL (ref 3.5–4.6)
ALP BLD-CCNC: 92 U/L (ref 35–104)
ALT SERPL-CCNC: 61 U/L (ref 0–41)
ANION GAP SERPL CALCULATED.3IONS-SCNC: 7 MEQ/L (ref 9–15)
AST SERPL-CCNC: 35 U/L (ref 0–40)
BASOPHILS ABSOLUTE: 0.1 K/UL (ref 0–0.2)
BASOPHILS RELATIVE PERCENT: 0.7 %
BILIRUB SERPL-MCNC: 0.5 MG/DL (ref 0.2–0.7)
BUN BLDV-MCNC: 22 MG/DL (ref 8–23)
CALCIUM SERPL-MCNC: 9.9 MG/DL (ref 8.5–9.9)
CHLORIDE BLD-SCNC: 99 MEQ/L (ref 95–107)
CK MB: 22.9 NG/ML (ref 0–6.7)
CO2: 30 MEQ/L (ref 20–31)
CREAT SERPL-MCNC: 1.1 MG/DL (ref 0.7–1.2)
CREATINE KINASE-MB INDEX: 2.2 % (ref 0–3.5)
EKG ATRIAL RATE: 79 BPM
EKG P AXIS: 39 DEGREES
EKG P-R INTERVAL: 182 MS
EKG Q-T INTERVAL: 396 MS
EKG QRS DURATION: 82 MS
EKG QTC CALCULATION (BAZETT): 454 MS
EKG R AXIS: 13 DEGREES
EKG T AXIS: 51 DEGREES
EKG VENTRICULAR RATE: 79 BPM
EOSINOPHILS ABSOLUTE: 0.1 K/UL (ref 0–0.7)
EOSINOPHILS RELATIVE PERCENT: 1.8 %
GFR AFRICAN AMERICAN: >60
GFR NON-AFRICAN AMERICAN: >60
GLOBULIN: 3.7 G/DL (ref 2.3–3.5)
GLUCOSE BLD-MCNC: 201 MG/DL (ref 60–115)
GLUCOSE BLD-MCNC: 326 MG/DL (ref 60–115)
GLUCOSE BLD-MCNC: 343 MG/DL (ref 70–99)
HCT VFR BLD CALC: 40.2 % (ref 42–52)
HEMOGLOBIN: 13.5 G/DL (ref 14–18)
INR BLD: 1
LYMPHOCYTES ABSOLUTE: 1.5 K/UL (ref 1–4.8)
LYMPHOCYTES RELATIVE PERCENT: 19.2 %
MAGNESIUM: 2 MG/DL (ref 1.7–2.4)
MCH RBC QN AUTO: 29 PG (ref 27–31.3)
MCHC RBC AUTO-ENTMCNC: 33.6 % (ref 33–37)
MCV RBC AUTO: 86.5 FL (ref 80–100)
MONOCYTES ABSOLUTE: 0.6 K/UL (ref 0.2–0.8)
MONOCYTES RELATIVE PERCENT: 7.7 %
NEUTROPHILS ABSOLUTE: 5.6 K/UL (ref 1.4–6.5)
NEUTROPHILS RELATIVE PERCENT: 70.6 %
PDW BLD-RTO: 13.3 % (ref 11.5–14.5)
PERFORMED ON: ABNORMAL
PERFORMED ON: ABNORMAL
PLATELET # BLD: 235 K/UL (ref 130–400)
POTASSIUM SERPL-SCNC: 3.6 MEQ/L (ref 3.4–4.9)
PRO-BNP: 142 PG/ML
PROTHROMBIN TIME: 13.1 SEC (ref 12.3–14.9)
RBC # BLD: 4.64 M/UL (ref 4.7–6.1)
SODIUM BLD-SCNC: 136 MEQ/L (ref 135–144)
TOTAL CK: 1054 U/L (ref 0–190)
TOTAL PROTEIN: 8 G/DL (ref 6.3–8)
TROPONIN: 0.02 NG/ML (ref 0–0.01)
TROPONIN: 0.03 NG/ML (ref 0–0.01)
WBC # BLD: 7.9 K/UL (ref 4.8–10.8)

## 2020-06-07 PROCEDURE — 71045 X-RAY EXAM CHEST 1 VIEW: CPT

## 2020-06-07 PROCEDURE — 83036 HEMOGLOBIN GLYCOSYLATED A1C: CPT

## 2020-06-07 PROCEDURE — 84484 ASSAY OF TROPONIN QUANT: CPT

## 2020-06-07 PROCEDURE — 2500000003 HC RX 250 WO HCPCS: Performed by: NURSE PRACTITIONER

## 2020-06-07 PROCEDURE — 96375 TX/PRO/DX INJ NEW DRUG ADDON: CPT

## 2020-06-07 PROCEDURE — 96374 THER/PROPH/DIAG INJ IV PUSH: CPT

## 2020-06-07 PROCEDURE — 85025 COMPLETE CBC W/AUTO DIFF WBC: CPT

## 2020-06-07 PROCEDURE — 85610 PROTHROMBIN TIME: CPT

## 2020-06-07 PROCEDURE — 36415 COLL VENOUS BLD VENIPUNCTURE: CPT

## 2020-06-07 PROCEDURE — 2580000003 HC RX 258: Performed by: NURSE PRACTITIONER

## 2020-06-07 PROCEDURE — 80053 COMPREHEN METABOLIC PANEL: CPT

## 2020-06-07 PROCEDURE — 6360000002 HC RX W HCPCS: Performed by: NURSE PRACTITIONER

## 2020-06-07 PROCEDURE — 83735 ASSAY OF MAGNESIUM: CPT

## 2020-06-07 PROCEDURE — 6370000000 HC RX 637 (ALT 250 FOR IP): Performed by: NURSE PRACTITIONER

## 2020-06-07 PROCEDURE — 82553 CREATINE MB FRACTION: CPT

## 2020-06-07 PROCEDURE — 93005 ELECTROCARDIOGRAM TRACING: CPT | Performed by: NURSE PRACTITIONER

## 2020-06-07 PROCEDURE — 83880 ASSAY OF NATRIURETIC PEPTIDE: CPT

## 2020-06-07 PROCEDURE — 82550 ASSAY OF CK (CPK): CPT

## 2020-06-07 PROCEDURE — 2060000000 HC ICU INTERMEDIATE R&B

## 2020-06-07 PROCEDURE — 99285 EMERGENCY DEPT VISIT HI MDM: CPT

## 2020-06-07 RX ORDER — OMEGA-3-ACID ETHYL ESTERS 1 G/1
CAPSULE, LIQUID FILLED ORAL
COMMUNITY
Start: 2020-03-31

## 2020-06-07 RX ORDER — ONDANSETRON 2 MG/ML
4 INJECTION INTRAMUSCULAR; INTRAVENOUS EVERY 6 HOURS PRN
Status: DISCONTINUED | OUTPATIENT
Start: 2020-06-07 | End: 2020-06-09 | Stop reason: HOSPADM

## 2020-06-07 RX ORDER — NICOTINE POLACRILEX 4 MG
15 LOZENGE BUCCAL PRN
Status: DISCONTINUED | OUTPATIENT
Start: 2020-06-07 | End: 2020-06-09 | Stop reason: HOSPADM

## 2020-06-07 RX ORDER — PROMETHAZINE HYDROCHLORIDE 12.5 MG/1
12.5 TABLET ORAL EVERY 6 HOURS PRN
Status: DISCONTINUED | OUTPATIENT
Start: 2020-06-07 | End: 2020-06-09 | Stop reason: HOSPADM

## 2020-06-07 RX ORDER — ASPIRIN 81 MG/1
TABLET, COATED ORAL
Status: ON HOLD | COMMUNITY
Start: 2020-04-01 | End: 2020-06-07

## 2020-06-07 RX ORDER — POLYETHYLENE GLYCOL 3350 17 G/17G
17 POWDER, FOR SOLUTION ORAL DAILY PRN
Status: DISCONTINUED | OUTPATIENT
Start: 2020-06-07 | End: 2020-06-09 | Stop reason: HOSPADM

## 2020-06-07 RX ORDER — ATORVASTATIN CALCIUM 40 MG/1
TABLET, FILM COATED ORAL
Status: ON HOLD | COMMUNITY
Start: 2019-08-10 | End: 2020-06-09 | Stop reason: SDUPTHER

## 2020-06-07 RX ORDER — CHLORTHALIDONE 25 MG/1
TABLET ORAL
Status: ON HOLD | COMMUNITY
Start: 2019-09-11 | End: 2020-06-09 | Stop reason: SDUPTHER

## 2020-06-07 RX ORDER — ACETAMINOPHEN 650 MG/1
650 SUPPOSITORY RECTAL EVERY 6 HOURS PRN
Status: DISCONTINUED | OUTPATIENT
Start: 2020-06-07 | End: 2020-06-09 | Stop reason: HOSPADM

## 2020-06-07 RX ORDER — METOPROLOL TARTRATE 50 MG/1
50 TABLET, FILM COATED ORAL ONCE
Status: COMPLETED | OUTPATIENT
Start: 2020-06-07 | End: 2020-06-07

## 2020-06-07 RX ORDER — ASPIRIN 81 MG/1
324 TABLET, CHEWABLE ORAL ONCE
Status: COMPLETED | OUTPATIENT
Start: 2020-06-07 | End: 2020-06-07

## 2020-06-07 RX ORDER — POTASSIUM CHLORIDE 750 MG/1
TABLET, FILM COATED, EXTENDED RELEASE ORAL
COMMUNITY
Start: 2020-04-01

## 2020-06-07 RX ORDER — ATORVASTATIN CALCIUM 20 MG/1
20 TABLET, FILM COATED ORAL NIGHTLY
Status: DISCONTINUED | OUTPATIENT
Start: 2020-06-07 | End: 2020-06-09 | Stop reason: HOSPADM

## 2020-06-07 RX ORDER — DEXTROSE MONOHYDRATE 25 G/50ML
12.5 INJECTION, SOLUTION INTRAVENOUS PRN
Status: DISCONTINUED | OUTPATIENT
Start: 2020-06-07 | End: 2020-06-09 | Stop reason: HOSPADM

## 2020-06-07 RX ORDER — 0.9 % SODIUM CHLORIDE 0.9 %
1000 INTRAVENOUS SOLUTION INTRAVENOUS ONCE
Status: COMPLETED | OUTPATIENT
Start: 2020-06-07 | End: 2020-06-07

## 2020-06-07 RX ORDER — FUROSEMIDE 10 MG/ML
20 INJECTION INTRAMUSCULAR; INTRAVENOUS ONCE
Status: COMPLETED | OUTPATIENT
Start: 2020-06-07 | End: 2020-06-07

## 2020-06-07 RX ORDER — ERGOCALCIFEROL 1.25 MG/1
CAPSULE ORAL
COMMUNITY
Start: 2020-04-01

## 2020-06-07 RX ORDER — CHLORTHALIDONE 25 MG/1
25 TABLET ORAL DAILY
Status: DISCONTINUED | OUTPATIENT
Start: 2020-06-08 | End: 2020-06-09 | Stop reason: HOSPADM

## 2020-06-07 RX ORDER — ACETAMINOPHEN 325 MG/1
650 TABLET ORAL EVERY 6 HOURS PRN
Status: DISCONTINUED | OUTPATIENT
Start: 2020-06-07 | End: 2020-06-09 | Stop reason: HOSPADM

## 2020-06-07 RX ORDER — HYDRALAZINE HYDROCHLORIDE 20 MG/ML
10 INJECTION INTRAMUSCULAR; INTRAVENOUS EVERY 6 HOURS PRN
Status: DISCONTINUED | OUTPATIENT
Start: 2020-06-07 | End: 2020-06-09 | Stop reason: HOSPADM

## 2020-06-07 RX ORDER — ATORVASTATIN CALCIUM 40 MG/1
TABLET, FILM COATED ORAL
Status: ON HOLD | COMMUNITY
Start: 2020-04-01 | End: 2020-06-07

## 2020-06-07 RX ORDER — ASPIRIN 81 MG/1
81 TABLET, CHEWABLE ORAL DAILY
Status: DISCONTINUED | OUTPATIENT
Start: 2020-06-08 | End: 2020-06-08

## 2020-06-07 RX ORDER — DEXTROSE MONOHYDRATE 50 MG/ML
100 INJECTION, SOLUTION INTRAVENOUS PRN
Status: DISCONTINUED | OUTPATIENT
Start: 2020-06-07 | End: 2020-06-09 | Stop reason: HOSPADM

## 2020-06-07 RX ORDER — METOPROLOL SUCCINATE 50 MG/1
TABLET, EXTENDED RELEASE ORAL
COMMUNITY
Start: 2020-04-01

## 2020-06-07 RX ORDER — METOPROLOL TARTRATE 50 MG/1
50 TABLET, FILM COATED ORAL 2 TIMES DAILY
Status: DISCONTINUED | OUTPATIENT
Start: 2020-06-07 | End: 2020-06-09 | Stop reason: HOSPADM

## 2020-06-07 RX ORDER — LABETALOL 20 MG/4 ML (5 MG/ML) INTRAVENOUS SYRINGE
10 EVERY 6 HOURS PRN
Status: DISCONTINUED | OUTPATIENT
Start: 2020-06-07 | End: 2020-06-09 | Stop reason: HOSPADM

## 2020-06-07 RX ORDER — ASPIRIN 81 MG/1
81 TABLET ORAL DAILY
Status: DISCONTINUED | OUTPATIENT
Start: 2020-06-08 | End: 2020-06-09 | Stop reason: HOSPADM

## 2020-06-07 RX ORDER — M-VIT,TX,IRON,MINS/CALC/FOLIC 27MG-0.4MG
1 TABLET ORAL DAILY
Status: DISCONTINUED | OUTPATIENT
Start: 2020-06-08 | End: 2020-06-09 | Stop reason: HOSPADM

## 2020-06-07 RX ORDER — HYDRALAZINE HYDROCHLORIDE 20 MG/ML
10 INJECTION INTRAMUSCULAR; INTRAVENOUS ONCE
Status: COMPLETED | OUTPATIENT
Start: 2020-06-07 | End: 2020-06-07

## 2020-06-07 RX ORDER — SODIUM CHLORIDE 0.9 % (FLUSH) 0.9 %
10 SYRINGE (ML) INJECTION EVERY 12 HOURS SCHEDULED
Status: DISCONTINUED | OUTPATIENT
Start: 2020-06-07 | End: 2020-06-09 | Stop reason: HOSPADM

## 2020-06-07 RX ORDER — SODIUM CHLORIDE 0.9 % (FLUSH) 0.9 %
10 SYRINGE (ML) INJECTION PRN
Status: DISCONTINUED | OUTPATIENT
Start: 2020-06-07 | End: 2020-06-09 | Stop reason: HOSPADM

## 2020-06-07 RX ADMIN — ASPIRIN 81 MG 324 MG: 81 TABLET ORAL at 19:40

## 2020-06-07 RX ADMIN — SODIUM CHLORIDE 1000 ML: 9 INJECTION, SOLUTION INTRAVENOUS at 19:39

## 2020-06-07 RX ADMIN — INSULIN LISPRO 2 UNITS: 100 INJECTION, SOLUTION INTRAVENOUS; SUBCUTANEOUS at 22:51

## 2020-06-07 RX ADMIN — FUROSEMIDE 20 MG: 10 INJECTION, SOLUTION INTRAVENOUS at 19:40

## 2020-06-07 RX ADMIN — HYDRALAZINE HYDROCHLORIDE 10 MG: 20 INJECTION INTRAMUSCULAR; INTRAVENOUS at 18:41

## 2020-06-07 RX ADMIN — INSULIN HUMAN 5 UNITS: 100 INJECTION, SOLUTION PARENTERAL at 19:51

## 2020-06-07 RX ADMIN — LABETALOL 20 MG/4 ML (5 MG/ML) INTRAVENOUS SYRINGE 10 MG: at 21:47

## 2020-06-07 RX ADMIN — METOPROLOL TARTRATE 50 MG: 50 TABLET, FILM COATED ORAL at 19:51

## 2020-06-07 RX ADMIN — ATORVASTATIN CALCIUM 20 MG: 20 TABLET, FILM COATED ORAL at 22:49

## 2020-06-07 SDOH — HEALTH STABILITY: MENTAL HEALTH: HOW OFTEN DO YOU HAVE A DRINK CONTAINING ALCOHOL?: NEVER

## 2020-06-07 ASSESSMENT — ENCOUNTER SYMPTOMS
BACK PAIN: 0
TROUBLE SWALLOWING: 0
SHORTNESS OF BREATH: 0
RHINORRHEA: 0
ABDOMINAL PAIN: 0
WHEEZING: 0
COUGH: 0
COLOR CHANGE: 0
NAUSEA: 0
CHEST TIGHTNESS: 0
SORE THROAT: 0
DIARRHEA: 0
VOMITING: 0

## 2020-06-07 ASSESSMENT — HEART SCORE: ECG: 0

## 2020-06-07 ASSESSMENT — PAIN SCALES - GENERAL: PAINLEVEL_OUTOF10: 0

## 2020-06-07 NOTE — ED PROVIDER NOTES
Yarsani service: None     Active member of club or organization: None     Attends meetings of clubs or organizations: None     Relationship status: None    Intimate partner violence     Fear of current or ex partner: None     Emotionally abused: None     Physically abused: None     Forced sexual activity: None   Other Topics Concern    None   Social History Narrative    None       SCREENINGS      Heart Score for chest pain patients  History: Slightly Suspicious  ECG: Normal  Patient Age: > 65 years  *Risk factors for Atherosclerotic disease: Diabetes Mellitus, Hypercholesterolemia, Hypertension  Risk Factors: > 3 Risk factors or history of atherosclerotic disease*  Troponin: > 1 and < 3X normal limit  Heart Score Total: 5      PHYSICAL EXAM    (up to 7 for level 4, 8 or more for level 5)     ED Triage Vitals [06/07/20 1725]   BP Temp Temp Source Pulse Resp SpO2 Height Weight   (!) 217/111 99 °F (37.2 °C) Oral 98 16 98 % 5' 11\" (1.803 m) 230 lb (104.3 kg)       Physical Exam  Constitutional:       General: He is not in acute distress. Appearance: Normal appearance. He is normal weight. He is not ill-appearing, toxic-appearing or diaphoretic. HENT:      Head: Normocephalic and atraumatic. Right Ear: External ear normal.      Left Ear: External ear normal.      Nose: Nose normal.      Mouth/Throat:      Mouth: Mucous membranes are moist.   Eyes:      General:         Right eye: No discharge. Left eye: No discharge. Conjunctiva/sclera: Conjunctivae normal.      Pupils: Pupils are equal, round, and reactive to light. Neck:      Musculoskeletal: Normal range of motion and neck supple. Cardiovascular:      Rate and Rhythm: Normal rate and regular rhythm. Pulses: Normal pulses. Pulmonary:      Effort: Pulmonary effort is normal.      Breath sounds: Normal breath sounds. Abdominal:      General: There is no distension. Palpations: Abdomen is soft. Tenderness:  There is no components within normal limits    Narrative:     J Carlos Trejo  [facility] tel. [phone],  Trop results called to and read back by Aurora Escobar NP, 06/07/2020 19:17, by  KARL   CKMB & RELATIVE PERCENT - Abnormal; Notable for the following components:    CK-MB 22.9 (*)     All other components within normal limits    Narrative:     [facility] tel. [phone],  Trop results called to and read back by Aurora Escobar NP, 06/07/2020 19:17, by  Gilmer Unger   POCT GLUCOSE - Abnormal; Notable for the following components:    POC Glucose 326 (*)     All other components within normal limits   BRAIN NATRIURETIC PEPTIDE    Narrative:     [facility] tel. [phone],  Trop results called to and read back by Aurora Escobar NP, 06/07/2020 19:17, by  Romaine KRAMER       All other labs were within normal range or not returned as of this dictation. EMERGENCY DEPARTMENT COURSE and DIFFERENTIAL DIAGNOSIS/MDM:   Vitals:    Vitals:    06/07/20 1830 06/07/20 1900 06/07/20 1908 06/07/20 1930   BP: (!) 182/106 (!) 184/103 (!) 189/111 (!) 179/105   Pulse: 81 92 84 108   Resp: 16 23 16 16   Temp:       TempSrc:       SpO2: 98% 95% 97% 98%   Weight:       Height:                MDM patient presents afebrile nontoxic no distress with noted hypertension on arrival with a history of poorly controlled hypertension. Patient has asymptomatic hypertensive emergency at this time. There are no further symptoms. There is noted new onset of bilateral lower extremity edema. The patient and family describe that they were doing significant heavy lifting a lot of heavy work over the past few days while moving decision made to do a full cardiac evaluation as the patient has only been seeing an endocrinologist for his diabetes and both his glucose and blood pressure elevated this time. Patient's lab work returns with a positive elevated troponin 0 0.032 as well as elevated CK in addition to elevated glucose over 300.   Patient additionally given Apresoline which shows a positive decrease in the blood pressure. Patient has no further symptomology. Concern is for possible cardiac events presenting without pain due to his history of diabetes. Lab work shows that the renal function is normal and is not contributing to the elevation of the troponin and there is no previous troponin to compare to. I discussed the concerns with the patient family and they are agreeable to admission to cardiology for further treatment evaluation. Contacted on-call cardiologist Dr. Salas Radford who is agreeable to the admission and has requested patient be placed on p.o. Lopressor twice daily 50 mg. Patient additionally given aspirin. Dr. Salas Radford has accept admission this patient for the treatment evaluation for work-up for further cardiac abnormalities. Requested that the hospitalist he placed a medical consult for the patient's diabetes. Patient was given fluids and IV insulin for the elevated glucose level. Hospitalist was paged out with perfect serve to notify for the admission to cardiology with request for medical management if needs present for the diabetes. Generic cardiac admission order set placed at the request of Dr. David Andersen       CONSULTS:  [facility] 67 HOSPITALIST    PROCEDURES:  Unless otherwise noted below, none     Procedures    FINAL IMPRESSION      1. Elevated troponin    2. Asymptomatic hypertensive urgency    3. Peripheral edema    4.  Type 2 diabetes mellitus with hyperglycemia, with long-term current use of insulin (HCC)    5. Elevated CK          DISPOSITION/PLAN   DISPOSITION        PATIENT REFERRED TO:  Michelle Rios MD  [address] [place] [address] [place] [address]  [phone]            DISCHARGE MEDICATIONS:  New Prescriptions    No medications on file          (Please notethat portions of this note were completed with a voice recognition program.  Efforts were made to edit the dictations but occasionally words

## 2020-06-07 NOTE — ED NOTES
Per Florentino Montejo Np   Hold Lasix until hydralazine starts to bring BP down to make sure patients BP is stable enough for Lasix      Jimmie Dey RN  06/07/20 [id number]

## 2020-06-07 NOTE — CARE COORDINATION
[facility] Case Management Initial Discharge Assessment    Met with Patient and son in law to discuss discharge plan. PCP: Darin Singleton MD                                Date of Last Visit: last month    If no PCP, list provided? N/A    Discharge Planning    Living Arrangements: independently at home    Who do you live with? sons    Who helps you with your care:  family    If lives at home:     Do you have any barriers navigating in your home? no    Patient can perform ADL? Pt may need some assistance    Current Services (outpatient and in home) :  None    Dialysis: No    Is transportation available to get to your appointments? Yes    DME Equipment:  no    Respiratory equipment: None    Respiratory provider:  no     Pharmacy:  yes - [facility]    Consult with Medication Assistance Program?  No      Does Patient Have a High-Risk for Readmission Diagnosis (CHF, PN, MI, COPD)? No.    Initial Discharge Plan? (Note: please see concurrent daily documentation for any updates after initial note). CM to assess for any further d/c needs or referrals. Pt is Turkmen speaking.       Electronically signed by Graciela Tsang on 6/7/2020 at 7:54 PM

## 2020-06-08 ENCOUNTER — APPOINTMENT (OUTPATIENT)
Dept: ULTRASOUND IMAGING | Age: 65
DRG: 282 | End: 2020-06-08
Payer: MEDICARE

## 2020-06-08 ENCOUNTER — APPOINTMENT (OUTPATIENT)
Dept: CARDIAC CATH/INVASIVE PROCEDURES | Age: 65
DRG: 282 | End: 2020-06-08
Payer: MEDICARE

## 2020-06-08 LAB
GLUCOSE BLD-MCNC: 108 MG/DL (ref 60–115)
GLUCOSE BLD-MCNC: 209 MG/DL (ref 60–115)
GLUCOSE BLD-MCNC: 303 MG/DL (ref 60–115)
GLUCOSE BLD-MCNC: 78 MG/DL (ref 60–115)
HBA1C MFR BLD: 10.6 % (ref 4.8–5.9)
HCT VFR BLD CALC: 37.2 % (ref 42–52)
HEMOGLOBIN: 12.3 G/DL (ref 14–18)
LV EF: 60 %
LVEF MODALITY: NORMAL
MCH RBC QN AUTO: 29.1 PG (ref 27–31.3)
MCHC RBC AUTO-ENTMCNC: 33.2 % (ref 33–37)
MCV RBC AUTO: 87.7 FL (ref 80–100)
PDW BLD-RTO: 13.1 % (ref 11.5–14.5)
PERFORMED ON: ABNORMAL
PERFORMED ON: ABNORMAL
PERFORMED ON: NORMAL
PERFORMED ON: NORMAL
PLATELET # BLD: 221 K/UL (ref 130–400)
RBC # BLD: 4.24 M/UL (ref 4.7–6.1)
TROPONIN: 0.02 NG/ML (ref 0–0.01)
WBC # BLD: 6.2 K/UL (ref 4.8–10.8)

## 2020-06-08 PROCEDURE — 6370000000 HC RX 637 (ALT 250 FOR IP): Performed by: INTERNAL MEDICINE

## 2020-06-08 PROCEDURE — 84484 ASSAY OF TROPONIN QUANT: CPT

## 2020-06-08 PROCEDURE — 2580000003 HC RX 258: Performed by: INTERNAL MEDICINE

## 2020-06-08 PROCEDURE — 6360000002 HC RX W HCPCS

## 2020-06-08 PROCEDURE — 99223 1ST HOSP IP/OBS HIGH 75: CPT | Performed by: INTERNAL MEDICINE

## 2020-06-08 PROCEDURE — 2709999900 HC NON-CHARGEABLE SUPPLY

## 2020-06-08 PROCEDURE — C1769 GUIDE WIRE: HCPCS

## 2020-06-08 PROCEDURE — 93458 L HRT ARTERY/VENTRICLE ANGIO: CPT | Performed by: INTERNAL MEDICINE

## 2020-06-08 PROCEDURE — 6370000000 HC RX 637 (ALT 250 FOR IP): Performed by: NURSE PRACTITIONER

## 2020-06-08 PROCEDURE — APPNB45 APP NON BILLABLE 31-45 MINUTES: Performed by: PHYSICIAN ASSISTANT

## 2020-06-08 PROCEDURE — 93306 TTE W/DOPPLER COMPLETE: CPT

## 2020-06-08 PROCEDURE — 2500000003 HC RX 250 WO HCPCS

## 2020-06-08 PROCEDURE — 6360000002 HC RX W HCPCS: Performed by: NURSE PRACTITIONER

## 2020-06-08 PROCEDURE — 85027 COMPLETE CBC AUTOMATED: CPT

## 2020-06-08 PROCEDURE — 4A023N7 MEASUREMENT OF CARDIAC SAMPLING AND PRESSURE, LEFT HEART, PERCUTANEOUS APPROACH: ICD-10-PCS | Performed by: INTERNAL MEDICINE

## 2020-06-08 PROCEDURE — 6360000004 HC RX CONTRAST MEDICATION: Performed by: INTERNAL MEDICINE

## 2020-06-08 PROCEDURE — 36415 COLL VENOUS BLD VENIPUNCTURE: CPT

## 2020-06-08 PROCEDURE — 93970 EXTREMITY STUDY: CPT

## 2020-06-08 PROCEDURE — B2111ZZ FLUOROSCOPY OF MULTIPLE CORONARY ARTERIES USING LOW OSMOLAR CONTRAST: ICD-10-PCS | Performed by: INTERNAL MEDICINE

## 2020-06-08 PROCEDURE — C1725 CATH, TRANSLUMIN NON-LASER: HCPCS

## 2020-06-08 PROCEDURE — 2500000003 HC RX 250 WO HCPCS: Performed by: INTERNAL MEDICINE

## 2020-06-08 PROCEDURE — 2580000003 HC RX 258

## 2020-06-08 PROCEDURE — B2151ZZ FLUOROSCOPY OF LEFT HEART USING LOW OSMOLAR CONTRAST: ICD-10-PCS | Performed by: INTERNAL MEDICINE

## 2020-06-08 PROCEDURE — 6370000000 HC RX 637 (ALT 250 FOR IP): Performed by: PHYSICIAN ASSISTANT

## 2020-06-08 PROCEDURE — 6360000002 HC RX W HCPCS: Performed by: INTERNAL MEDICINE

## 2020-06-08 PROCEDURE — C1894 INTRO/SHEATH, NON-LASER: HCPCS

## 2020-06-08 PROCEDURE — 2060000000 HC ICU INTERMEDIATE R&B

## 2020-06-08 RX ORDER — SODIUM CHLORIDE 0.9 % (FLUSH) 0.9 %
10 SYRINGE (ML) INJECTION PRN
Status: DISCONTINUED | OUTPATIENT
Start: 2020-06-08 | End: 2020-06-09 | Stop reason: HOSPADM

## 2020-06-08 RX ORDER — SPIRONOLACTONE 25 MG/1
25 TABLET ORAL DAILY
Status: DISCONTINUED | OUTPATIENT
Start: 2020-06-08 | End: 2020-06-09 | Stop reason: HOSPADM

## 2020-06-08 RX ORDER — SODIUM CHLORIDE 9 MG/ML
INJECTION, SOLUTION INTRAVENOUS CONTINUOUS
Status: DISCONTINUED | OUTPATIENT
Start: 2020-06-08 | End: 2020-06-08 | Stop reason: SDUPTHER

## 2020-06-08 RX ORDER — HYDRALAZINE HYDROCHLORIDE 25 MG/1
25 TABLET, FILM COATED ORAL EVERY 8 HOURS SCHEDULED
Status: DISCONTINUED | OUTPATIENT
Start: 2020-06-08 | End: 2020-06-08

## 2020-06-08 RX ORDER — SODIUM CHLORIDE 0.9 % (FLUSH) 0.9 %
10 SYRINGE (ML) INJECTION EVERY 12 HOURS SCHEDULED
Status: DISCONTINUED | OUTPATIENT
Start: 2020-06-08 | End: 2020-06-09 | Stop reason: HOSPADM

## 2020-06-08 RX ORDER — HYDRALAZINE HYDROCHLORIDE 50 MG/1
50 TABLET, FILM COATED ORAL EVERY 8 HOURS SCHEDULED
Status: DISCONTINUED | OUTPATIENT
Start: 2020-06-08 | End: 2020-06-09 | Stop reason: HOSPADM

## 2020-06-08 RX ORDER — SODIUM CHLORIDE 9 MG/ML
INJECTION, SOLUTION INTRAVENOUS CONTINUOUS
Status: ACTIVE | OUTPATIENT
Start: 2020-06-08 | End: 2020-06-09

## 2020-06-08 RX ORDER — AMLODIPINE BESYLATE 5 MG/1
5 TABLET ORAL DAILY
Status: DISCONTINUED | OUTPATIENT
Start: 2020-06-08 | End: 2020-06-09 | Stop reason: HOSPADM

## 2020-06-08 RX ADMIN — AMLODIPINE BESYLATE 5 MG: 5 TABLET ORAL at 20:43

## 2020-06-08 RX ADMIN — Medication 10 ML: at 20:43

## 2020-06-08 RX ADMIN — CHLORTHALIDONE 25 MG: 25 TABLET ORAL at 08:42

## 2020-06-08 RX ADMIN — METOPROLOL TARTRATE 50 MG: 50 TABLET, FILM COATED ORAL at 08:42

## 2020-06-08 RX ADMIN — HYDRALAZINE HYDROCHLORIDE 50 MG: 50 TABLET, FILM COATED ORAL at 22:37

## 2020-06-08 RX ADMIN — METOPROLOL TARTRATE 50 MG: 50 TABLET, FILM COATED ORAL at 20:43

## 2020-06-08 RX ADMIN — IOPAMIDOL 60 ML: 612 INJECTION, SOLUTION INTRAVENOUS at 18:36

## 2020-06-08 RX ADMIN — ASPIRIN 81 MG: 81 TABLET, COATED ORAL at 08:43

## 2020-06-08 RX ADMIN — DOXYCYCLINE 100 MG: 100 INJECTION, POWDER, LYOPHILIZED, FOR SOLUTION INTRAVENOUS at 14:19

## 2020-06-08 RX ADMIN — SODIUM CHLORIDE: 9 INJECTION, SOLUTION INTRAVENOUS at 16:50

## 2020-06-08 RX ADMIN — INSULIN LISPRO 45 UNITS: 100 INJECTION, SUSPENSION SUBCUTANEOUS at 10:15

## 2020-06-08 RX ADMIN — ATORVASTATIN CALCIUM 20 MG: 20 TABLET, FILM COATED ORAL at 20:43

## 2020-06-08 RX ADMIN — SODIUM CHLORIDE: 9 INJECTION, SOLUTION INTRAVENOUS at 20:43

## 2020-06-08 RX ADMIN — HYDRALAZINE HYDROCHLORIDE 10 MG: 20 INJECTION INTRAMUSCULAR; INTRAVENOUS at 17:26

## 2020-06-08 RX ADMIN — ENOXAPARIN SODIUM 40 MG: 40 INJECTION SUBCUTANEOUS at 08:43

## 2020-06-08 RX ADMIN — METOPROLOL TARTRATE 50 MG: 50 TABLET, FILM COATED ORAL at 01:59

## 2020-06-08 RX ADMIN — DOXYCYCLINE 100 MG: 100 INJECTION, POWDER, LYOPHILIZED, FOR SOLUTION INTRAVENOUS at 01:46

## 2020-06-08 RX ADMIN — MULTIPLE VITAMINS W/ MINERALS TAB 1 TABLET: TAB at 08:42

## 2020-06-08 RX ADMIN — SPIRONOLACTONE 25 MG: 25 TABLET ORAL at 20:43

## 2020-06-08 ASSESSMENT — ENCOUNTER SYMPTOMS
NAUSEA: 0
CHEST TIGHTNESS: 0
VOMITING: 0
SHORTNESS OF BREATH: 0
COLOR CHANGE: 0

## 2020-06-08 ASSESSMENT — PAIN SCALES - GENERAL
PAINLEVEL_OUTOF10: 0

## 2020-06-08 NOTE — PROGRESS NOTES
Hospitalist Progress Note      PCP: Digna Aceves MD    Date of Admission: 6/7/2020    Chief Complaint:      Subjective: :  Patient seen and examined at bedside. Patient to have cardiac cath.   Lower extremity ultrasound was ordered and pending        Medications:  Reviewed    Infusion Medications    sodium chloride      dextrose       Scheduled Medications    hydrALAZINE  50 mg Oral 3 times per day    amLODIPine  5 mg Oral Daily    spironolactone  25 mg Oral Daily    sodium chloride flush  10 mL Intravenous 2 times per day    sodium chloride flush  10 mL Intravenous 2 times per day    enoxaparin  40 mg Subcutaneous Daily    metoprolol tartrate  50 mg Oral BID    insulin lispro  0-12 Units Subcutaneous TID WC    insulin lispro  0-6 Units Subcutaneous Nightly    atorvastatin  20 mg Oral Nightly    therapeutic multivitamin-minerals  1 tablet Oral Daily    chlorthalidone  25 mg Oral Daily    aspirin  81 mg Oral Daily    insulin lispro prot & lispro  45 Units Subcutaneous BID WC    doxycycline (VIBRAMYCIN) IV  100 mg Intravenous Q12H     PRN Meds: sodium chloride flush, sodium chloride flush, acetaminophen **OR** acetaminophen, polyethylene glycol, promethazine **OR** ondansetron, hydrALAZINE, labetalol, glucose, dextrose, glucagon (rDNA), dextrose    No intake or output data in the 24 hours ending 06/08/20 1551    Exam:    BP (!) 156/87   Pulse 70   Temp 97.7 °F (36.5 °C) (Oral)   Resp 16   Ht 5' 11\" (1.803 m)   Wt 230 lb (104.3 kg)   SpO2 99%   BMI 32.08 kg/m²     CONSTITUTIONAL:  awake, alert, cooperative, no apparent distress, and appears stated age  EYES:  pupils equal, round and reactive to light and conjunctiva normal  NECK:  supple, symmetrical, trachea midline, thyroid not enlarged, symmetric, no tenderness, no jugular venous distension and no carotid bruits  LUNGS:  No increased work of breathing, good air exchange, clear to auscultation bilaterally, no crackles or wheezing  CARDIOVASCULAR:  regular rate and rhythm, no murmur noted, pulses 2 plus all extermities bilaterally, carotids without bruits bilaterally and 2+ bilateral LE edema. ABDOMEN:  normal bowel sounds, distended, non-tender and no masses palpated  MUSCULOSKELETAL:  there is no redness, warmth, or swelling of the joints  NEUROLOGIC:  A&O x3.   he is non-native [place] speaker  SKIN:   Patient has a nonpruritic erythematous rash of lower extremities. No other rash noted on trunk or arms. Does have circular pattern yearning for possible fungal.      no bruising or bleeding         Labs:   Recent Labs     06/07/20  1815 06/08/20  0553   WBC 7.9 6.2   HGB 13.5* 12.3*   HCT 40.2* 37.2*    221     Recent Labs     06/07/20  1815      K 3.6   CL 99   CO2 30   BUN 22   CREATININE 1.10   CALCIUM 9.9     Recent Labs     06/07/20  1815   AST 35   ALT 61*   BILITOT 0.5   ALKPHOS 92     Recent Labs     06/07/20  1815   INR 1.0     Recent Labs     06/07/20  1815 06/07/20  2110 06/08/20  0038   CKTOTAL 1,054*  --   --    TROPONINI 0.032* 0.018* 0.022*     No flowsheet data found. Urinalysis:    No results found for: Valeria Wagner [facility] [address], [address] [place] [address] [address]., [address], [facility] 994    Radiology:  XR CHEST PORTABLE    (Results Pending)   US DUP LOWER EXTREMITIES BILATERAL VENOUS    (Results Pending)           Assessment/Plan:    Active Hospital Problems    Diagnosis Date Noted    Elevated troponin [R79.89] 06/07/2020    Hyperlipidemia [E78.5] 11/21/2017    Hypertension [I10] 11/21/2017    Type [facility] [Z12.9] 11/21/2017     N STEMI/hypertension/hyperlipidemia:  Cardiology primary  Possible cath today  Blood pressure is suboptimal, however is improved from admission    Type 2 diabetes: With hyperglycemia.   poorly controlled, A1c 10.6  Regimen prior to admission: Metformin 1000 mg tablet, 75/ 25 however patient unable to tell me how many units  -Currently on 45 units subcu of Humalog mix twice daily with meals, increased to 50 units  -Continue sliding scale, Accu-Cheks    Rash: Rash of the lower extremities noted. Patient states this is nonpruritic. Do not see rash on other parts of the body. Circular pattern makes me concerned for fungal infection. I patient is currently on doxycycline empirically for bilateral cellulitis. Lower extremity edema: 2+ edema of the lower extremities. Patient has subungual hematoma of the right great hallux.  -Ultrasound lower extremities to rule out DVTs ordered and pending    Additional work up or/and treatment plan may be added today or then after based on clinical progression. I am managing a portion of pt care. Some medical issues are handled by other specialists. Additional work up and treatment should be done in out pt setting by pt PCP and other out pt providers. In addition to examining and evaluating pt, I spent additional time explaining care, normal and abnormal findings, and treatment plan. All of pt questions were answered. Counseling, diet and education were  provided. Case will be discussed with nursing staff when appropriate. Family will be updated if and when appropriate. Diet: DIET CARDIAC; Carb Control: 4 carb choices (60 gms)/meal; Low Sodium (2 GM);  Daily Fluid Restriction: 1800 ml; No Caffeine  Diet NPO Time Specified    Code Status: Full Code          Electronically signed by Saloni Head DO on 6/8/2020 at 3:51 PM

## 2020-06-08 NOTE — CONSULTS
[facility]     PATIENT NAME:  Lula Pimentel    MRN:  [id number]  SERVICE DATE:  6/7/2020   SERVICE TIME:  8:41 PM    Primary Care Physician: Alicia Ospina MD         SUBJECTIVE  CHIEF COMPLAINT:    Leg swelling,      HPI:  This is a 71 y.o. male w/ PMH HTN. HLD and DM2 admitted by cardiology service for elevated tropinin,  Edema. Internal medicine consulted for DM and HTN management. He states he has had leg selling bilaterally for one week. Denies CP, HA, SOB, dizziness and syncope. He takes metformin for DM,  Checks BS and finds they always run high. He has long standing HTN,  Believes he takes metoprolol but does not think it works. SBP >200 on admission,  Asymptomatic. ,  Asymtomatic. States he did take his medication today. Denies any other health issues or physical complaints. PAST MEDICAL HISTORY:    Past Medical History:   Diagnosis Date    Hyperlipidemia     Hypertension     Type 2 diabetes mellitus without complication ([facility]      PAST SURGICAL HISTORY:  History reviewed. No pertinent surgical history. FAMILY HISTORY:  History reviewed. No pertinent family history.   SOCIAL HISTORY:    Social History     Socioeconomic History    Marital status:      Spouse name: Not on file    Number of children: Not on file    Years of education: Not on file    Highest education level: Not on file   Occupational History    Not on file   Social Needs    Financial resource strain: Not on file    Food insecurity     Worry: Not on file     Inability: Not on file    Transportation needs     Medical: Not on file     Non-medical: Not on file   Tobacco Use    Smoking status: Never Smoker    Smokeless tobacco: Never Used   Substance and Sexual Activity    Alcohol use: Not Currently     Frequency: Never    Drug use: Never    Sexual activity: Not on file   Lifestyle    Physical activity     Days per week: Not on file     Minutes per today's visit:    Most recent labs and imaging results reviewed.      LABS:    Recent Results (from the past 24 hour(s))   CBC Auto Differential    Collection Time: 06/07/20  6:15 PM   Result Value Ref Range    WBC 7.9 4.8 - 10.8 K/uL    RBC 4.64 (L) 4.70 - 6.10 M/uL    Hemoglobin 13.5 (L) 14.0 - 18.0 g/dL    Hematocrit 40.2 (L) 42.0 - 52.0 %    MCV 86.5 80.0 - 100.0 fL    MCH 29.0 27.0 - 31.3 pg    MCHC 33.6 33.0 - 37.0 %    RDW 13.3 11.5 - 14.5 %    Platelets 183 884 - 755 K/uL    Neutrophils % 70.6 %    Lymphocytes % 19.2 %    Monocytes % 7.7 %    Eosinophils % 1.8 %    Basophils % 0.7 %    Neutrophils Absolute 5.6 1.4 - 6.5 K/uL    Lymphocytes Absolute 1.5 1.0 - 4.8 K/uL    Monocytes Absolute 0.6 0.2 - 0.8 K/uL    Eosinophils Absolute 0.1 0.0 - 0.7 K/uL    Basophils Absolute 0.1 0.0 - 0.2 K/uL   Comprehensive Metabolic Panel    Collection Time: 06/07/20  6:15 PM   Result Value Ref Range    Sodium 136 135 - 144 mEq/L    Potassium 3.6 3.4 - 4.9 mEq/L    Chloride 99 95 - 107 mEq/L    CO2 30 20 - 31 mEq/L    Anion Gap 7 (L) 9 - 15 mEq/L    Glucose 343 (H) 70 - 99 mg/dL    BUN 22 8 - 23 mg/dL    CREATININE 1.10 0.70 - 1.20 mg/dL    GFR Non-African American >60.0 >60    GFR  >60.0 >60    Calcium 9.9 8.5 - 9.9 mg/dL    Total Protein 8.0 6.3 - 8.0 g/dL    Alb 4.3 3.5 - 4.6 g/dL    Total Bilirubin 0.5 0.2 - 0.7 mg/dL    Alkaline Phosphatase 92 35 - 104 U/L    ALT 61 (H) 0 - 41 U/L    AST 35 0 - 40 U/L    Globulin 3.7 (H) 2.3 - 3.5 g/dL   Brain Natriuretic Peptide    Collection Time: 06/07/20  6:15 PM   Result Value Ref Range    Pro- pg/mL   Protime-INR    Collection Time: 06/07/20  6:15 PM   Result Value Ref Range    Protime 13.1 12.3 - 14.9 sec    INR 1.0    Magnesium    Collection Time: 06/07/20  6:15 PM   Result Value Ref Range    Magnesium 2.0 1.7 - 2.4 mg/dL   CK    Collection Time: 06/07/20  6:15 PM   Result Value Ref Range    Total CK 1,054 (H) 0 - 190 U/L   Troponin    Collection Time: physician    Attending's Note:  Pt was seen and evaluated and discussed care with NP. I agree with the above assessment and plan. Patient presented to the ED with bilateral lower extremity swelling. Patient said that the swelling has been going on for the past couple of weeks. He denies any chest pain or shortness of breath. No other symptoms. He denies any fever. Patient has edema, erythema, mild tenderness in the lower extremities up to the knees. Questionable cellulitis versus DVT. Ultrasound DVT ordered. Will start patient on doxycycline to cover for cellulitis. Unlikely heart failure related lower extremity edema. proBNP is not elevated and no cardiac history in the past.  However, troponin is mildly elevated. Cardiology was contacted in the ER and they admitted the patient due to concern for NSTEMI. EKG showed normal sinus rhythm with signs of LVH. Patient has history of diabetes and hypertension that are not well controlled. His blood pressure on presentation was 270/111. Most likely the elevated troponin is related to his hypertensive emergency/urgency. Will control his blood pressure with PRN medication. Will resume his home medication after verification. Patient blood glucose on presentation was 343. He is on insulin at home. We will resume home insulin and place patient on insulin sliding scale.     Nisa Davide, [address] [facility]

## 2020-06-08 NOTE — CARE COORDINATION
[facility] Case Management Initial Discharge Assessment    Met with Patient and son in law to discuss discharge plan. PCP: Mainor Schrader MD                                Date of Last Visit: last month    If no PCP, list provided? N/A    Discharge Planning    Living Arrangements: independently at home    Who do you live with? sons    Who helps you with your care:  self or family    If lives at home:     Do you have any barriers navigating in your home? no    Patient can perform ADL? Yes    Current Services (outpatient and in home) :  None    Dialysis: No    Is transportation available to get to your appointments? Yes    DME Equipment:  no    Respiratory equipment: None    Respiratory provider:  no     Pharmacy:  yes - [facility]    Consult with Medication Assistance Program?  No        Does Patient Have a High-Risk for Readmission Diagnosis (CHF, PN, MI, COPD)? noInitial Discharge Plan? (Note: please see concurrent daily documentation for any updates after initial note). CM to assess for any further d/c needs or referrals.      Electronically signed by Marletta Sandifer on 6/7/2020 at 8:19 PM

## 2020-06-08 NOTE — CARE COORDINATION
Social work note: Patient reports no needs at this time. He states he can drive and his vehicle is in the [facility] parking lot. Does have a son in law who can drive him if needed. Patient plans to follow up with Dr. Ross Spencer via telehealth as office is closed, reports the same for Dr. Susan Silverman.   Electronically signed by AJ Blackwell on 6/8/2020 at 12:13 PM

## 2020-06-08 NOTE — H&P
elevated x3 in a flat pattern at 0.032, 0.018 and 0.022. Patient has no complaint of chest pain or shortness of breath. Internal medicine was consulted for management of patient's uncontrolled diabetes as well as skin rash on lower extremities with concern for possible cellulitis. He has been started on doxycycline IV. Bilateral lower extremity venous duplex completed this morning and result is pending. Serial blood pressures remain elevated albeit mildly improved since admission. He has no known prior cardiac history.     Allergies   Allergen Reactions    Lisinopril      facial swelling     Losartan        Current Facility-Administered Medications   Medication Dose Route Frequency Provider Last Rate Last Dose    hydrALAZINE (APRESOLINE) tablet 50 mg  50 mg Oral 3 times per day Yonis Baker DO        sodium chloride flush 0.9 % injection 10 mL  10 mL Intravenous 2 times per day Dorcas Longoria MD        sodium chloride flush 0.9 % injection 10 mL  10 mL Intravenous PRN Dorcas Longoria MD        acetaminophen (TYLENOL) tablet 650 mg  650 mg Oral Q6H PRN Dorcas Longoria MD        Or   [facility] acetaminophen (TYLENOL) suppository 650 mg  650 mg Rectal Q6H PRN Dorcas Longoria MD        polyethylene glycol (GLYCOLAX) packet 17 g  17 g Oral Daily PRN Dorcas Longoria MD        promethazine (PHENERGAN) tablet 12.5 mg  12.5 mg Oral Q6H PRN Dorcas Longoria MD        Or    [facility] injection 4 mg  4 mg Intravenous Q6H PRN Docras Longoria MD        enoxaparin (LOVENOX) injection 40 mg  40 mg Subcutaneous Daily Dorcas Longoria MD   40 mg at 06/08/20 0843    metoprolol tartrate (LOPRESSOR) tablet 50 mg  50 mg Oral BID Dorcas Longoria MD   50 mg at 06/08/20 0842    hydrALAZINE (APRESOLINE) injection 10 mg  10 mg Intravenous Q6H PRN Deatrice Bolus, APRN - CNP        labetalol (NORMODYNE;TRANDATE) injection syringe 10 mg  10 mg Intravenous Q6H PRN Spencer Armijo, APRN - CNP   10 mg at Social Needs    Financial resource strain: None    Food insecurity     Worry: None     Inability: None    Transportation needs     Medical: None     Non-medical: None   Tobacco Use    Smoking status: Never Smoker    Smokeless tobacco: Never Used   Substance and Sexual Activity    Alcohol use: Not Currently     Frequency: Never    Drug use: Never    Sexual activity: None   Lifestyle    Physical activity     Days per week: None     Minutes per session: None    Stress: None   Relationships    Social connections     Talks on phone: None     Gets together: None     Attends [employer] service: None     Active member of club or organization: None     Attends meetings of clubs or organizations: None     Relationship status: None    Intimate partner violence     Fear of current or ex partner: None     Emotionally abused: None     Physically abused: None     Forced sexual activity: None   Other Topics Concern    None   Social History Narrative    None       Family Hx:  History reviewed. No pertinent family history. Review ofSystems:   Review of Systems   Constitutional: Negative for activity change, fatigue and fever. HENT: Negative for congestion. Respiratory: Negative for chest tightness and shortness of breath. Cardiovascular: Positive for leg swelling. Negative for chest pain and palpitations. Gastrointestinal: Negative for nausea and vomiting. Musculoskeletal: Negative for arthralgias. Skin: Positive for rash. Negative for color change. Neurological: Negative for dizziness, syncope and light-headedness. Psychiatric/Behavioral: Negative for agitation and behavioral problems. Physical Examination:    BP (!) 172/96   Pulse 71   Temp 98.1 °F (36.7 °C) (Oral)   Resp 16   Ht 5' 11\" (1.803 m)   Wt 230 lb (104.3 kg)   SpO2 98%   BMI 32.08 kg/m²    Physical Exam  Constitutional:       General: He is not in acute distress. Appearance: Normal appearance. He is obese.    HENT: Head: Normocephalic and atraumatic. Neck:      Musculoskeletal: Normal range of motion and neck supple. Cardiovascular:      Rate and Rhythm: Normal rate and regular rhythm. Heart sounds: Murmur present. Pulmonary:      Effort: Pulmonary effort is normal. No respiratory distress. Breath sounds: No wheezing, rhonchi or rales. Abdominal:      Palpations: Abdomen is soft. Tenderness: There is no abdominal tenderness. Comments: Obese abdomen   Musculoskeletal: Normal range of motion. Right lower leg: Edema (1-2+ ) present. Left lower leg: Edema (trace-1+) present. Comments: Scabbed lesions to bilateral LE with circumferential erythema noted around lesions; skin rash anterior aspect right lower leg; blister noted to left great toe   Skin:     General: Skin is warm and dry. Coloration: Skin is not pale. Neurological:      General: No focal deficit present. Mental Status: He is alert and oriented to person, place, and time. Cranial Nerves: No cranial nerve deficit.    Psychiatric:         Mood and Affect: Mood normal.         Behavior: Behavior normal.          LABS:  CBC:   Lab Results   Component Value Date    WBC 6.2 06/08/2020    RBC 4.24 06/08/2020    RBC 4.79 05/21/2012    HGB 12.3 06/08/2020    HCT 37.2 06/08/2020    MCV 87.7 06/08/2020    MCH 29.1 06/08/2020    MCHC 33.2 06/08/2020    RDW 13.1 06/08/2020     06/08/2020    MPV 12.4 05/21/2012     CBC with Differential:   Lab Results   Component Value Date    WBC 6.2 06/08/2020    RBC 4.24 06/08/2020    RBC 4.79 05/21/2012    HGB 12.3 06/08/2020    HCT 37.2 06/08/2020     06/08/2020    MCV 87.7 06/08/2020    MCH 29.1 06/08/2020    MCHC 33.2 06/08/2020    RDW 13.1 06/08/2020    LYMPHOPCT 19.2 06/07/2020    MONOPCT 7.7 06/07/2020    BASOPCT 0.7 06/07/2020    MONOSABS 0.6 06/07/2020    LYMPHSABS 1.5 06/07/2020    EOSABS 0.1 06/07/2020    BASOSABS 0.1 06/07/2020     CMP:    Lab Results

## 2020-06-09 VITALS
OXYGEN SATURATION: 98 % | SYSTOLIC BLOOD PRESSURE: 151 MMHG | HEIGHT: 71 IN | HEART RATE: 78 BPM | RESPIRATION RATE: 18 BRPM | WEIGHT: 211.4 LBS | DIASTOLIC BLOOD PRESSURE: 86 MMHG | TEMPERATURE: 96.8 F | BODY MASS INDEX: 29.6 KG/M2

## 2020-06-09 LAB
ANION GAP SERPL CALCULATED.3IONS-SCNC: 15 MEQ/L (ref 9–15)
BUN BLDV-MCNC: 20 MG/DL (ref 8–23)
CALCIUM SERPL-MCNC: 9 MG/DL (ref 8.5–9.9)
CHLORIDE BLD-SCNC: 99 MEQ/L (ref 95–107)
CO2: 23 MEQ/L (ref 20–31)
CREAT SERPL-MCNC: 0.93 MG/DL (ref 0.7–1.2)
GFR AFRICAN AMERICAN: >60
GFR NON-AFRICAN AMERICAN: >60
GLUCOSE BLD-MCNC: 134 MG/DL (ref 60–115)
GLUCOSE BLD-MCNC: 144 MG/DL (ref 70–99)
GLUCOSE BLD-MCNC: 59 MG/DL (ref 60–115)
GLUCOSE BLD-MCNC: 86 MG/DL (ref 60–115)
PERFORMED ON: ABNORMAL
PERFORMED ON: ABNORMAL
PERFORMED ON: NORMAL
POTASSIUM SERPL-SCNC: 3.6 MEQ/L (ref 3.4–4.9)
SODIUM BLD-SCNC: 137 MEQ/L (ref 135–144)

## 2020-06-09 PROCEDURE — 6370000000 HC RX 637 (ALT 250 FOR IP): Performed by: INTERNAL MEDICINE

## 2020-06-09 PROCEDURE — 6370000000 HC RX 637 (ALT 250 FOR IP): Performed by: PHYSICIAN ASSISTANT

## 2020-06-09 PROCEDURE — 99239 HOSP IP/OBS DSCHRG MGMT >30: CPT | Performed by: INTERNAL MEDICINE

## 2020-06-09 PROCEDURE — 2580000003 HC RX 258: Performed by: INTERNAL MEDICINE

## 2020-06-09 PROCEDURE — 2500000003 HC RX 250 WO HCPCS: Performed by: INTERNAL MEDICINE

## 2020-06-09 PROCEDURE — 36415 COLL VENOUS BLD VENIPUNCTURE: CPT

## 2020-06-09 PROCEDURE — 6370000000 HC RX 637 (ALT 250 FOR IP): Performed by: NURSE PRACTITIONER

## 2020-06-09 PROCEDURE — 80048 BASIC METABOLIC PNL TOTAL CA: CPT

## 2020-06-09 PROCEDURE — 6360000002 HC RX W HCPCS: Performed by: INTERNAL MEDICINE

## 2020-06-09 PROCEDURE — 93010 ELECTROCARDIOGRAM REPORT: CPT | Performed by: INTERNAL MEDICINE

## 2020-06-09 RX ORDER — DOXYCYCLINE HYCLATE 100 MG
100 TABLET ORAL 2 TIMES DAILY
Qty: 6 TABLET | Refills: 0 | Status: SHIPPED | OUTPATIENT
Start: 2020-06-09 | End: 2020-06-12

## 2020-06-09 RX ORDER — ATORVASTATIN CALCIUM 40 MG/1
40 TABLET, FILM COATED ORAL DAILY
Qty: 30 TABLET | Refills: 3 | Status: SHIPPED
Start: 2020-06-09 | End: 2021-05-18 | Stop reason: SDUPTHER

## 2020-06-09 RX ORDER — HYDRALAZINE HYDROCHLORIDE 50 MG/1
50 TABLET, FILM COATED ORAL EVERY 8 HOURS SCHEDULED
Qty: 90 TABLET | Refills: 3 | Status: SHIPPED | OUTPATIENT
Start: 2020-06-09

## 2020-06-09 RX ORDER — CHLORTHALIDONE 25 MG/1
25 TABLET ORAL DAILY
Qty: 30 TABLET | Refills: 3 | Status: SHIPPED | OUTPATIENT
Start: 2020-06-09 | End: 2021-05-18 | Stop reason: SDUPTHER

## 2020-06-09 RX ORDER — SPIRONOLACTONE 25 MG/1
25 TABLET ORAL DAILY
Qty: 30 TABLET | Refills: 3 | Status: SHIPPED | OUTPATIENT
Start: 2020-06-10 | End: 2021-05-20 | Stop reason: ALTCHOICE

## 2020-06-09 RX ADMIN — HYDRALAZINE HYDROCHLORIDE 50 MG: 50 TABLET, FILM COATED ORAL at 05:38

## 2020-06-09 RX ADMIN — ASPIRIN 81 MG: 81 TABLET, COATED ORAL at 07:57

## 2020-06-09 RX ADMIN — DOXYCYCLINE 100 MG: 100 INJECTION, POWDER, LYOPHILIZED, FOR SOLUTION INTRAVENOUS at 00:03

## 2020-06-09 RX ADMIN — INSULIN LISPRO 45 UNITS: 100 INJECTION, SUSPENSION SUBCUTANEOUS at 07:58

## 2020-06-09 RX ADMIN — HYDRALAZINE HYDROCHLORIDE 50 MG: 50 TABLET, FILM COATED ORAL at 12:28

## 2020-06-09 RX ADMIN — AMLODIPINE BESYLATE 5 MG: 5 TABLET ORAL at 07:57

## 2020-06-09 RX ADMIN — SPIRONOLACTONE 25 MG: 25 TABLET ORAL at 07:57

## 2020-06-09 RX ADMIN — ENOXAPARIN SODIUM 40 MG: 40 INJECTION SUBCUTANEOUS at 07:58

## 2020-06-09 RX ADMIN — METOPROLOL TARTRATE 50 MG: 50 TABLET, FILM COATED ORAL at 07:57

## 2020-06-09 RX ADMIN — DOXYCYCLINE 100 MG: 100 INJECTION, POWDER, LYOPHILIZED, FOR SOLUTION INTRAVENOUS at 12:29

## 2020-06-09 RX ADMIN — CHLORTHALIDONE 25 MG: 25 TABLET ORAL at 07:57

## 2020-06-09 RX ADMIN — MULTIPLE VITAMINS W/ MINERALS TAB 1 TABLET: TAB at 07:57

## 2020-06-09 ASSESSMENT — PAIN SCALES - GENERAL
PAINLEVEL_OUTOF10: 0
PAINLEVEL_OUTOF10: 0

## 2020-06-09 NOTE — DISCHARGE SUMMARY
Musculoskeletal: Normal range of motion. Skin:     General: Skin is warm and dry. Neurological:      Mental Status: He is alert and oriented to person, place, and time. Deep Tendon Reflexes: Reflexes are normal and symmetric. Psychiatric:         Behavior: Behavior normal.         Thought Content: Thought content normal.         Judgment: Judgment normal.         Medications:  Current Discharge Medication List      CONTINUE these medications which have NOT CHANGED    Details   vitamin D (ERGOCALCIFEROL) 1.25 MG (79833 UT) CAPS capsule       metoprolol succinate (TOPROL XL) 50 MG extended release tablet TK 1 T PO D WITH SUPPER      potassium chloride (KLOR-CON) 10 MEQ extended release tablet TK 1 T PO QD WITH TATA      atorvastatin (LIPITOR) 40 MG tablet Take by mouth      chlorthalidone (HYGROTON) 25 MG tablet Take by mouth      insulin lispro protamine & lispro (HUMALOG MIX 75/25 KWIKPEN) (75-25) 100 UNIT per ML SUPN injection pen 45 units twice  Day  Qty: 15 pen, Refills: 3    Associated Diagnoses: Type 2 diabetes mellitus without complication, without long-term current use of insulin ([facility])      metFORMIN (GLUCOPHAGE) 1000 MG tablet TAKE 1 TABLET BY MOUTH TWICE DAILY WITH MEALS  Qty: 180 tablet, Refills: 3    Comments: **Patient requests 90 days supply**  Associated Diagnoses: Diabetic polyneuropathy associated with type 2 diabetes mellitus (HCC)      aspirin 81 MG tablet Take by mouth      Multiple Vitamin (MVI, CELEBRATE, CHEWABLE TABLET) Take 1 tablet by mouth daily  Qty: 30 tablet, Refills: 06      omega-3 acid ethyl esters (LOVAZA) 1 g capsule       Insulin Pen Needle (NOVOFINE) 32G X 6 MM MISC Bid  Qty: 100 each, Refills: 3      Alcohol Swabs (ALCOHOL PREP) 70 % PADS       Blood Glucose Monitoring Suppl (ONE TOUCH ULTRA 2) w/Device KIT       ONE TOUCH ULTRA TEST strip       AQUALANCE LANCETS 30G MISC       !!  amLODIPine (NORVASC) 5 MG tablet TAKE 1 TABLET BY MOUTH DAILY  Qty: 90 tablet, Refills: 3    Comments: **Patient requests 90 days supply**      !! amLODIPine (NORVASC) 5 MG tablet TAKE 1 TABLET BY MOUTH DAILY  Qty: 90 tablet, Refills: 3    Comments: **Patient requests 90 days supply**       !! - Potential duplicate medications found. Please discuss with provider. STOP taking these medications       ASPIRIN LOW DOSE 81 MG EC tablet Comments:   Reason for Stopping:         metoprolol (LOPRESSOR) 100 MG tablet Comments:   Reason for Stopping:               Significant Diagnostics:   Radiology: Xr Chest Portable    Result Date: 6/8/2020  EXAMINATION: XR CHEST PORTABLE REASON FOR EXAM: Dyspnea FINDINGS: Plain films reveal heart and mediastinal structures within normal limits and the lung fields free of active disease. Bones and soft tissues intact. NO ACTIVE DISEASE IN THE CHEST. Us Dup Lower Extremities Bilateral Venous    Result Date: 6/8/2020  Exam: US DUP LOWER EXTREMITIES BILATERAL VENOUS CLINICAL:     Check for DVT Imaging and analysis of flow in the right and left lower extremities reveal the deep veins to be readily compressible and free of thrombus from the groin to the knee. There is normal phasicity and color Doppler signal. The posterior calf veins that were visualized show no evidence of thrombus. NEGATIVE STUDY.        Labs:   Recent Results (from the past 72 hour(s))   CBC Auto Differential    Collection Time: 06/07/20  6:15 PM   Result Value Ref Range    WBC 7.9 4.8 - 10.8 K/uL    RBC 4.64 (L) 4.70 - 6.10 M/uL    Hemoglobin 13.5 (L) 14.0 - 18.0 g/dL    Hematocrit 40.2 (L) 42.0 - 52.0 %    MCV 86.5 80.0 - 100.0 fL    MCH 29.0 27.0 - 31.3 pg    MCHC 33.6 33.0 - 37.0 %    RDW 13.3 11.5 - 14.5 %    Platelets 333 038 - 061 K/uL    Neutrophils % 70.6 %    Lymphocytes % 19.2 %    Monocytes % 7.7 %    Eosinophils % 1.8 %    Basophils % 0.7 %    Neutrophils Absolute 5.6 1.4 - 6.5 K/uL    Lymphocytes Absolute 1.5 1.0 - 4.8 K/uL    Monocytes Absolute 0.6 0.2 - 0.8 K/uL    Eosinophils 06/09/20 11:12 AM   Result Value Ref Range    POC Glucose 59 (L) 60 - 115 mg/dl    Performed on ACCU-CHEK    POCT Glucose    Collection Time: 06/09/20  4:14 PM   Result Value Ref Range    POC Glucose 86 60 - 115 mg/dl    Performed on ACCU-CHEK              Follow-up visits:   Layne Robert MD  [address]  [phone]    On 7/23/2020  820am. Please wear a mask and come 5 min early, bring photo ID, Ins. card, meds and d/c papers. Assessment:  Active Hospital Problems    Diagnosis Date Noted    Elevated troponin [R79.89] 06/07/2020    Hyperlipidemia [E78.5] 11/21/2017    Hypertension [I10] 11/21/2017    Type 2 diabetes mellitus without complication ([facility] [G84.3] 11/21/2017     1. NSTEMI type II secondary to HTN urgency  2. S/p LHC with normal coronaries  3. HTN--improved    Plan:   1. Discharge home  2. See me as outpatient.       Electronically signed by Clara Reyna, [address] [facility] 6/9/2020 at 5:09 PM

## 2020-06-12 RX ORDER — HYDRALAZINE HYDROCHLORIDE 50 MG/1
50 TABLET, FILM COATED ORAL EVERY 8 HOURS SCHEDULED
Qty: 270 TABLET | OUTPATIENT
Start: 2020-06-12

## 2020-07-07 PROBLEM — R79.89 ELEVATED TROPONIN: Status: RESOLVED | Noted: 2020-06-07 | Resolved: 2020-07-07

## 2020-07-07 PROBLEM — R77.8 ELEVATED TROPONIN: Status: RESOLVED | Noted: 2020-06-07 | Resolved: 2020-07-07

## 2020-11-17 LAB — C-REACTIVE PROTEIN: 1.24 MG/DL

## 2020-12-15 ENCOUNTER — VIRTUAL VISIT (OUTPATIENT)
Dept: INFECTIOUS DISEASES | Age: 65
End: 2020-12-15
Payer: MEDICARE

## 2020-12-15 PROCEDURE — G8428 CUR MEDS NOT DOCUMENT: HCPCS | Performed by: INTERNAL MEDICINE

## 2020-12-15 PROCEDURE — 2022F DILAT RTA XM EVC RTNOPTHY: CPT | Performed by: INTERNAL MEDICINE

## 2020-12-15 PROCEDURE — 4040F PNEUMOC VAC/ADMIN/RCVD: CPT | Performed by: INTERNAL MEDICINE

## 2020-12-15 PROCEDURE — G8484 FLU IMMUNIZE NO ADMIN: HCPCS | Performed by: INTERNAL MEDICINE

## 2020-12-15 PROCEDURE — 99213 OFFICE O/P EST LOW 20 MIN: CPT | Performed by: INTERNAL MEDICINE

## 2020-12-15 PROCEDURE — G8417 CALC BMI ABV UP PARAM F/U: HCPCS | Performed by: INTERNAL MEDICINE

## 2020-12-15 PROCEDURE — 3017F COLORECTAL CA SCREEN DOC REV: CPT | Performed by: INTERNAL MEDICINE

## 2020-12-15 PROCEDURE — 1036F TOBACCO NON-USER: CPT | Performed by: INTERNAL MEDICINE

## 2020-12-15 PROCEDURE — 1123F ACP DISCUSS/DSCN MKR DOCD: CPT | Performed by: INTERNAL MEDICINE

## 2020-12-15 PROCEDURE — 3046F HEMOGLOBIN A1C LEVEL >9.0%: CPT | Performed by: INTERNAL MEDICINE

## 2020-12-15 NOTE — PROGRESS NOTES
Subjective:      Patient ID: Mary Ann Leong is a 71 y.o. male. HPI  Patient was informed that this is a billable visit. I am in my office, patient is at home/NH. Elisha. me/ [employer] was used for communication and exam. Chart was reviewed and labs/ X Rays were discussed with the patient. our practice is making every effort to adhere to current recommendations, including social distancing. For the health and safety of our patients, and to prevent unnecessary exposure, we are currently scheduling telephone appointments. Patient was informed that these appointments are official appointments and will be billed through patient's insurance . Patient confirms this and agreed to the televised visit. Time spend in review of chart and on the IPAD using Doxy. me was 15  minutes. Follow-up R [facility] 50 for left diabetic foot ulcer with underlying acute osteomyelitis. Currently at the nursing home  On IV Zosyn, well tolerated    Review of Systems   All other systems reviewed and are negative. Objective:   Physical Exam  Constitutional:       General: He is not in acute distress. HENT:      Head: Normocephalic. Nose: No congestion. Eyes:      General: No scleral icterus. Pulmonary:      Effort: Pulmonary effort is normal. No respiratory distress. Abdominal:      General: There is no distension. Palpations: Abdomen is soft. Musculoskeletal:         General: No swelling. Right lower leg: No edema. Left lower leg: No edema. Skin:     Coloration: Skin is not jaundiced. Findings: No erythema or rash. Neurological:      Mental Status: He is alert. Motor: No weakness.    Psychiatric:         Mood and Affect: Mood normal.         Behavior: Behavior normal.       L foot plantar aspect 1.5 cm x 0.5 cm depth 0.5 cm minimal serosanguineous drainage  No leg edema  CRP=1.24  Hb=11.3  Wound culture mixed bacteria including beta-lactamase positive organisms  Assessment: Acute osteomyelitis of left foot  Left diabetic foot ulcer with slow healing  polymicrobial bacterial infection      Plan:      Continue IV Zosyn till December 30, to finish 6 weeks course of IV antibiotics  DC PICC line in 2 weeks  May discharge from the nursing home after IV antibiotics are done on Augmentin 875 mg p.o. 2 times daily for 1 month  Up in 4 weeks        Kaiden Nielesn MD

## 2021-01-11 ENCOUNTER — TELEPHONE (OUTPATIENT)
Dept: INFECTIOUS DISEASES | Age: 66
End: 2021-01-11

## 2021-01-11 NOTE — TELEPHONE ENCOUNTER
Called patient JERODOVSAURAV to return my call in the office to reschedule an Apoointment  Pt MIMI 1/11/21.

## 2021-04-29 ENCOUNTER — OFFICE VISIT (OUTPATIENT)
Dept: ENDOCRINOLOGY | Age: 66
End: 2021-04-29
Payer: MEDICARE

## 2021-04-29 VITALS
OXYGEN SATURATION: 97 % | HEART RATE: 76 BPM | SYSTOLIC BLOOD PRESSURE: 132 MMHG | BODY MASS INDEX: 33.47 KG/M2 | DIASTOLIC BLOOD PRESSURE: 89 MMHG | WEIGHT: 240 LBS

## 2021-04-29 DIAGNOSIS — E11.9 TYPE 2 DIABETES MELLITUS WITHOUT COMPLICATION, WITHOUT LONG-TERM CURRENT USE OF INSULIN (HCC): ICD-10-CM

## 2021-04-29 DIAGNOSIS — E11.69 TYPE 2 DIABETES MELLITUS WITH OTHER SPECIFIED COMPLICATION, WITH LONG-TERM CURRENT USE OF INSULIN (HCC): ICD-10-CM

## 2021-04-29 DIAGNOSIS — E11.42 DIABETIC POLYNEUROPATHY ASSOCIATED WITH TYPE 2 DIABETES MELLITUS (HCC): Primary | ICD-10-CM

## 2021-04-29 DIAGNOSIS — Z79.4 TYPE 2 DIABETES MELLITUS WITH OTHER SPECIFIED COMPLICATION, WITH LONG-TERM CURRENT USE OF INSULIN (HCC): ICD-10-CM

## 2021-04-29 LAB
CHP ED QC CHECK: NORMAL
GLUCOSE BLD-MCNC: 393 MG/DL
HBA1C MFR BLD: 12.6 %

## 2021-04-29 PROCEDURE — 83036 HEMOGLOBIN GLYCOSYLATED A1C: CPT | Performed by: INTERNAL MEDICINE

## 2021-04-29 PROCEDURE — 1123F ACP DISCUSS/DSCN MKR DOCD: CPT | Performed by: INTERNAL MEDICINE

## 2021-04-29 PROCEDURE — 3017F COLORECTAL CA SCREEN DOC REV: CPT | Performed by: INTERNAL MEDICINE

## 2021-04-29 PROCEDURE — 1036F TOBACCO NON-USER: CPT | Performed by: INTERNAL MEDICINE

## 2021-04-29 PROCEDURE — 3046F HEMOGLOBIN A1C LEVEL >9.0%: CPT | Performed by: INTERNAL MEDICINE

## 2021-04-29 PROCEDURE — 2022F DILAT RTA XM EVC RTNOPTHY: CPT | Performed by: INTERNAL MEDICINE

## 2021-04-29 PROCEDURE — G8427 DOCREV CUR MEDS BY ELIG CLIN: HCPCS | Performed by: INTERNAL MEDICINE

## 2021-04-29 PROCEDURE — G8417 CALC BMI ABV UP PARAM F/U: HCPCS | Performed by: INTERNAL MEDICINE

## 2021-04-29 PROCEDURE — 99213 OFFICE O/P EST LOW 20 MIN: CPT | Performed by: INTERNAL MEDICINE

## 2021-04-29 PROCEDURE — 82962 GLUCOSE BLOOD TEST: CPT | Performed by: INTERNAL MEDICINE

## 2021-04-29 PROCEDURE — 4040F PNEUMOC VAC/ADMIN/RCVD: CPT | Performed by: INTERNAL MEDICINE

## 2021-04-29 RX ORDER — INSULIN LISPRO 100 [IU]/ML
INJECTION, SUSPENSION SUBCUTANEOUS
Qty: 15 PEN | Refills: 3 | Status: SHIPPED | OUTPATIENT
Start: 2021-04-29 | End: 2021-09-23 | Stop reason: SDUPTHER

## 2021-04-29 NOTE — PROGRESS NOTES
Subjective:      Patient ID: Ameena Meeks is a 76 y.o. male. Follow-up on type 2 diabetes patient's blood sugars have been high in the 2-3 100+ range A1c is 12.6 currently on 75/25 Humalog twice a day plus Metformin and diet has been variable    Diabetes  He presents for his follow-up diabetic visit. He has type 2 diabetes mellitus. Risk factors for coronary artery disease include obesity. Current diabetic treatment includes insulin injections (75/25 Humalog plus Metformin). He is currently taking insulin pre-breakfast and pre-dinner. His overall blood glucose range is >200 mg/dl. (Lab Results       Component                Value               Date                       LABA1C                   12.6                04/29/2021              )    Results for Sai Ford (MRN [id number]) as of 5/9/2021 17:35   Ref. Range 9/27/2019 15:17 1/20/2020 11:26 1/20/2020 12:26 6/7/2020 21:10 4/29/2021 10:00   Hemoglobin A1C Latest Units: % 9.0 11.6 10.7 (H) 10.6 (H) 12.6       Results for Sai Ford (MRN [id number]) as of 4/29/2021 10:05   Ref. Range 9/27/2019 15:17 1/20/2020 11:26 1/20/2020 12:26 6/7/2020 21:10 4/29/2021 10:00   Hemoglobin A1C Latest Units: % 9.0 11.6 10.7 (H) 10.6 (H) 12.6     Results for Sai Ford (MRN [id number]) as of 4/29/2021 10:05   Ref.  Range 4/29/2021 09:51 4/29/2021 10:00   Glucose Latest Units: mg/dL 393    Hemoglobin A1C Latest Units: %  12.6     Patient Active Problem List   Diagnosis    Type 2 diabetes mellitus without complication (HCC)    Hyperlipidemia    Hypertension     Allergies   Allergen Reactions    Lisinopril      facial swelling     Losartan        Current Outpatient Medications:     metFORMIN (GLUCOPHAGE) 1000 MG tablet, TAKE 1 TABLET BY MOUTH TWICE DAILY WITH MEALS, Disp: 180 tablet, Rfl: 3    atorvastatin (LIPITOR) 40 MG tablet, Take 1 tablet by mouth daily, Disp: 30 tablet, Rfl: 3    chlorthalidone (HYGROTON) 25 MG tablet, Take 1 tablet by mouth daily, Disp: 30 tablet, Conjunctiva/sclera: Conjunctivae normal.   Neck:      Musculoskeletal: Normal range of motion and neck supple. Trachea: Trachea normal.   Cardiovascular:      Rate and Rhythm: Normal rate. Musculoskeletal: Normal range of motion. Feet:    Neurological:      General: No focal deficit present. Mental Status: He is alert and oriented to person, place, and time. Psychiatric:         Mood and Affect: Mood normal.         Behavior: Behavior normal.         Assessment:       Diagnosis Orders   1. Diabetic polyneuropathy associated with type 2 diabetes mellitus ([facility]     2.  Type 2 diabetes mellitus with other specified complication, with long-term current use of insulin ([facility]             Plan:      Orders Placed This Encounter   Procedures    POCT Glucose    POCT glycosylated hemoglobin (Hb A1C)     DIABETES FOOT EXAM     Change insulin to 3 times daily continue Metformin discussed other treatment options including insulin pump for better control A1c goal of 7 or lower follow-up in 2 to 3 weeks  Orders Placed This Encounter   Medications    insulin lispro protamine & lispro (HUMALOG MIX 75/25 KWIKPEN) (75-25) 100 UNIT per ML SUPN injection pen     Si  units am 30 units lunch and 50 units dinner     Dispense:  15 pen     Refill:  3             Manuela Turner MD

## 2021-05-06 ENCOUNTER — TELEPHONE (OUTPATIENT)
Dept: ENDOCRINOLOGY | Age: 66
End: 2021-05-06

## 2021-05-06 NOTE — TELEPHONE ENCOUNTER
EVANGELISTA from [employer] called to check status of Fax from May 3.     Request was for Office Notes and Medical Necessity.     Melody Arana [phone].

## 2021-05-07 DIAGNOSIS — Z79.4 TYPE 2 DIABETES MELLITUS WITH OTHER SPECIFIED COMPLICATION, WITH LONG-TERM CURRENT USE OF INSULIN (HCC): Primary | ICD-10-CM

## 2021-05-07 DIAGNOSIS — E11.69 TYPE 2 DIABETES MELLITUS WITH OTHER SPECIFIED COMPLICATION, WITH LONG-TERM CURRENT USE OF INSULIN (HCC): Primary | ICD-10-CM

## 2021-05-12 DIAGNOSIS — E11.69 TYPE 2 DIABETES MELLITUS WITH OTHER SPECIFIED COMPLICATION, WITH LONG-TERM CURRENT USE OF INSULIN (HCC): ICD-10-CM

## 2021-05-12 DIAGNOSIS — Z79.4 TYPE 2 DIABETES MELLITUS WITH OTHER SPECIFIED COMPLICATION, WITH LONG-TERM CURRENT USE OF INSULIN (HCC): ICD-10-CM

## 2021-05-12 LAB
ANION GAP SERPL CALCULATED.3IONS-SCNC: 14 MEQ/L (ref 9–15)
BUN BLDV-MCNC: 33 MG/DL (ref 8–23)
CALCIUM SERPL-MCNC: 9.8 MG/DL (ref 8.5–9.9)
CHLORIDE BLD-SCNC: 97 MEQ/L (ref 95–107)
CO2: 27 MEQ/L (ref 20–31)
CREAT SERPL-MCNC: 1.24 MG/DL (ref 0.7–1.2)
GFR AFRICAN AMERICAN: >60
GFR NON-AFRICAN AMERICAN: 58.3
GLUCOSE BLD-MCNC: 313 MG/DL (ref 70–99)
POTASSIUM SERPL-SCNC: 3.8 MEQ/L (ref 3.4–4.9)
SODIUM BLD-SCNC: 138 MEQ/L (ref 135–144)

## 2021-05-14 LAB — C-PEPTIDE: 4.3 NG/ML (ref 1.1–4.4)

## 2021-05-18 DIAGNOSIS — E11.42 DIABETIC POLYNEUROPATHY ASSOCIATED WITH TYPE 2 DIABETES MELLITUS (HCC): ICD-10-CM

## 2021-05-18 RX ORDER — LISINOPRIL AND HYDROCHLOROTHIAZIDE 25; 20 MG/1; MG/1
TABLET ORAL
Qty: 30 TABLET | Refills: 3 | Status: SHIPPED | OUTPATIENT
Start: 2021-05-18 | End: 2021-05-20 | Stop reason: ALTCHOICE

## 2021-05-18 RX ORDER — ATORVASTATIN CALCIUM 40 MG/1
40 TABLET, FILM COATED ORAL DAILY
Qty: 30 TABLET | Refills: 3 | Status: SHIPPED | OUTPATIENT
Start: 2021-05-18 | End: 2021-05-21

## 2021-05-18 RX ORDER — CHLORTHALIDONE 25 MG/1
25 TABLET ORAL DAILY
Qty: 30 TABLET | Refills: 3 | Status: SHIPPED | OUTPATIENT
Start: 2021-05-18 | End: 2021-09-23 | Stop reason: SDUPTHER

## 2021-05-18 NOTE — TELEPHONE ENCOUNTER
Rx request   Requested Prescriptions     Pending Prescriptions Disp Refills    atorvastatin (LIPITOR) 40 MG tablet 30 tablet 3     Sig: Take 1 tablet by mouth daily    chlorthalidone (HYGROTON) 25 MG tablet 30 tablet 3     Sig: Take 1 tablet by mouth daily    lisinopril-hydroCHLOROthiazide (PRINZIDE;ZESTORETIC) 20-25 MG per tablet 30 tablet 3     Sig: TK 1 T PO QD    SITagliptin (JANUVIA) 100 MG tablet 30 tablet 3     Sig: Take 1 tablet by mouth daily     LOV 4/29/2021  Next Visit Date:  Future Appointments   Date Time Provider Zita Crespo   7/29/2021  9:45 AM Andreina Calzada MD [facility]

## 2021-05-19 ENCOUNTER — TELEPHONE (OUTPATIENT)
Dept: ENDOCRINOLOGY | Age: 66
End: 2021-05-19

## 2021-05-19 NOTE — TELEPHONE ENCOUNTER
Guru from [facility] was calling  About C-Peptide results and fasting glucose.  Results were faxed to [phone]

## 2021-05-20 ENCOUNTER — OFFICE VISIT (OUTPATIENT)
Dept: ENDOCRINOLOGY | Age: 66
End: 2021-05-20
Payer: MEDICARE

## 2021-05-20 VITALS
OXYGEN SATURATION: 96 % | HEART RATE: 78 BPM | DIASTOLIC BLOOD PRESSURE: 89 MMHG | WEIGHT: 243 LBS | BODY MASS INDEX: 33.89 KG/M2 | SYSTOLIC BLOOD PRESSURE: 139 MMHG

## 2021-05-20 DIAGNOSIS — E11.42 DIABETIC POLYNEUROPATHY ASSOCIATED WITH TYPE 2 DIABETES MELLITUS (HCC): Primary | ICD-10-CM

## 2021-05-20 LAB
CHP ED QC CHECK: NORMAL
GLUCOSE BLD-MCNC: 151 MG/DL

## 2021-05-20 PROCEDURE — 2022F DILAT RTA XM EVC RTNOPTHY: CPT | Performed by: INTERNAL MEDICINE

## 2021-05-20 PROCEDURE — 3046F HEMOGLOBIN A1C LEVEL >9.0%: CPT | Performed by: INTERNAL MEDICINE

## 2021-05-20 PROCEDURE — G8427 DOCREV CUR MEDS BY ELIG CLIN: HCPCS | Performed by: INTERNAL MEDICINE

## 2021-05-20 PROCEDURE — 3017F COLORECTAL CA SCREEN DOC REV: CPT | Performed by: INTERNAL MEDICINE

## 2021-05-20 PROCEDURE — G8417 CALC BMI ABV UP PARAM F/U: HCPCS | Performed by: INTERNAL MEDICINE

## 2021-05-20 PROCEDURE — 1123F ACP DISCUSS/DSCN MKR DOCD: CPT | Performed by: INTERNAL MEDICINE

## 2021-05-20 PROCEDURE — 4040F PNEUMOC VAC/ADMIN/RCVD: CPT | Performed by: INTERNAL MEDICINE

## 2021-05-20 PROCEDURE — 82962 GLUCOSE BLOOD TEST: CPT | Performed by: INTERNAL MEDICINE

## 2021-05-20 PROCEDURE — 1036F TOBACCO NON-USER: CPT | Performed by: INTERNAL MEDICINE

## 2021-05-20 PROCEDURE — 99213 OFFICE O/P EST LOW 20 MIN: CPT | Performed by: INTERNAL MEDICINE

## 2021-05-20 RX ORDER — LISINOPRIL 20 MG/1
20 TABLET ORAL DAILY
Qty: 90 TABLET | Refills: 1
Start: 2021-05-20

## 2021-05-20 RX ORDER — INSULIN GLARGINE 100 [IU]/ML
INJECTION, SOLUTION SUBCUTANEOUS
Qty: 15 PEN | Refills: 3 | Status: SHIPPED | OUTPATIENT
Start: 2021-05-20

## 2021-05-20 RX ORDER — INSULIN GLARGINE 100 [IU]/ML
30 INJECTION, SOLUTION SUBCUTANEOUS NIGHTLY
COMMUNITY
Start: 2021-04-01 | End: 2021-05-20 | Stop reason: SDUPTHER

## 2021-05-20 NOTE — PROGRESS NOTES
2021    Assessment:       Diagnosis Orders   1. Diabetic polyneuropathy associated with type 2 diabetes mellitus (HCC)  POCT Glucose    Microalbumin / Creatinine Urine Ratio    Lipid, Fasting    Hemoglobin X7J    Basic Metabolic Panel, Fasting         PLAN:     Add Lantus 40 units at bedtime plus continue Humalog insulin 3 times daily A1c goal of 7 or lower patient is looking into getting insulin pump  Orders Placed This Encounter   Medications    LANTUS SOLOSTAR 100 UNIT/ML injection pen     Si UNITS AT BEDTIME     Dispense:  15 pen     Refill:  03    lisinopril (PRINIVIL;ZESTRIL) 20 MG tablet     Sig: Take 1 tablet by mouth daily     Dispense:  90 tablet     Refill:  1           Orders Placed This Encounter   Procedures    POCT Glucose       Subjective:     Chief Complaint   Patient presents with    Diabetes     Vitals:    21 1454   BP: 139/89   Pulse: 78   SpO2: 96%   Weight: 243 lb (110.2 kg)     Wt Readings from Last 3 Encounters:   21 243 lb (110.2 kg)   21 240 lb (108.9 kg)   20 211 lb 6.4 oz (95.9 kg)     BP Readings from Last 3 Encounters:   21 139/89   21 132/89   20 (!) 151/86     Follow-up on type 2 diabetes patient is on 75/25 insulin 3 times daily blood sugar still high fasting complications include chronic kidney disease possible globin A1c was 12  Labs reviewed C-peptide was normal    Diabetes  He presents for his follow-up diabetic visit. He has type 2 diabetes mellitus. There are no hypoglycemic complications. Symptoms are stable. Diabetic complications include nephropathy. Risk factors for coronary artery disease include obesity. Current diabetic treatment includes insulin injections. He is currently taking insulin pre-breakfast, pre-lunch and pre-dinner. Results for Lamin Vance (MRN [id number]) as of 2021 15:12   Ref.  Range 2021 08:37 2021 14:52   Sodium Latest Ref Range: 135 - 144 mEq/L 138    Potassium Latest Ref Range: 3.4 - 4.9 mEq/L 3.8    Chloride Latest Ref Range: 95 - 107 mEq/L 97    CO2 Latest Ref Range: 20 - 31 mEq/L 27    BUN Latest Ref Range: 8 - 23 mg/dL 33 (H)    Creatinine Latest Ref Range: 0.70 - 1.20 mg/dL 1.24 (H)    Anion Gap Latest Ref Range: 9 - 15 mEq/L 14    GFR Non- Latest Ref Range: >60  58.3 (L)    GFR  Latest Ref Range: >60  >60.0    Glucose Latest Units: mg/dL 313 (H) 151   Calcium Latest Ref Range: 8.5 - 9.9 mg/dL 9.8    C-Peptide Latest Ref Range: 1.1 - 4.4 ng/mL 4.3        Past Medical History:   Diagnosis Date    Hyperlipidemia     Hypertension     Type 2 diabetes mellitus without complication (HCC)      No past surgical history on file. Social History     Socioeconomic History    Marital status:      Spouse name: Not on file    Number of children: Not on file    Years of education: Not on file    Highest education level: Not on file   Occupational History    Not on file   Tobacco Use    Smoking status: Never Smoker    Smokeless tobacco: Never Used   Vaping Use    Vaping Use: Never used   Substance and Sexual Activity    Alcohol use: Not Currently    Drug use: Never    Sexual activity: Not on file   Other Topics Concern    Not on file   Social History Narrative    Not on file     Social Determinants of Health     Financial Resource Strain:     Difficulty of Paying Living Expenses:    Food Insecurity:     Worried About Running Out of Food in the Last Year:     [address] in the Last Year:    Transportation Needs:     Lack of Transportation (Medical):      Lack of Transportation (Non-Medical):    Physical Activity:     Days of Exercise per Week:     Minutes of Exercise per Session:    Stress:     Feeling of Stress :    Social Connections:     Frequency of Communication with Friends and Family:     Frequency of Social Gatherings with Friends and Family:     Attends Mu-ism Services:     Active Member of Clubs or Organizations:     Attends Club or Organization Meetings:     Marital Status:    Intimate Partner Violence:     Fear of Current or Ex-Partner:     Emotionally Abused:     Physically Abused:     Sexually Abused:      No family history on file.   Allergies   Allergen Reactions    Lisinopril      facial swelling     Losartan        Current Outpatient Medications:     atorvastatin (LIPITOR) 40 MG tablet, Take 1 tablet by mouth daily, Disp: 30 tablet, Rfl: 3    chlorthalidone (HYGROTON) 25 MG tablet, Take 1 tablet by mouth daily, Disp: 30 tablet, Rfl: 3    lisinopril-hydroCHLOROthiazide (PRINZIDE;ZESTORETIC) 20-25 MG per tablet, TK 1 T PO QD, Disp: 30 tablet, Rfl: 3    SITagliptin (JANUVIA) 100 MG tablet, Take 1 tablet by mouth daily, Disp: 30 tablet, Rfl: 3    insulin lispro protamine & lispro (HUMALOG MIX 75/25 KWIKPEN) (75-25) 100 UNIT per ML SUPN injection pen, 50  units am 30 units lunch and 50 units dinner, Disp: 15 pen, Rfl: 3    metFORMIN (GLUCOPHAGE) 1000 MG tablet, TAKE 1 TABLET BY MOUTH TWICE DAILY WITH MEALS, Disp: 180 tablet, Rfl: 3    hydrALAZINE (APRESOLINE) 50 MG tablet, Take 1 tablet by mouth every 8 hours, Disp: 90 tablet, Rfl: 3    spironolactone (ALDACTONE) 25 MG tablet, Take 1 tablet by mouth daily, Disp: 30 tablet, Rfl: 3    vitamin D (ERGOCALCIFEROL) 1.25 MG (09785 UT) CAPS capsule, , Disp: , Rfl:     metoprolol succinate (TOPROL XL) 50 MG extended release tablet, TK 1 T PO D WITH SUPPER, Disp: , Rfl:     omega-3 acid ethyl esters (LOVAZA) 1 g capsule, , Disp: , Rfl:     potassium chloride (KLOR-CON) 10 MEQ extended release tablet, TK 1 T PO QD WITH TATA, Disp: , Rfl:     Insulin Pen Needle (NOVOFINE) 32G X 6 MM MISC, Bid, Disp: 100 each, Rfl: 3    aspirin 81 MG tablet, Take by mouth, Disp: , Rfl:     Alcohol Swabs (ALCOHOL PREP) 70 % PADS, , Disp: , Rfl:     Blood Glucose Monitoring Suppl (ONE TOUCH ULTRA 2) w/Device KIT, , Disp: , Rfl:     ONE TOUCH ULTRA TEST strip, , Disp: , Rfl:     AQUALANCE LANCETS 30G MISC, , Disp: , Rfl:     amLODIPine (NORVASC) 5 MG tablet, TAKE 1 TABLET BY MOUTH DAILY, Disp: 90 tablet, Rfl: 3    Multiple Vitamin (MVI, CELEBRATE, CHEWABLE TABLET), Take 1 tablet by mouth daily, Disp: 30 tablet, Rfl: 06    LANTUS SOLOSTAR 100 UNIT/ML injection pen, 30 Units nightly (Patient not taking: Reported on 5/20/2021), Disp: , Rfl:   Lab Results   Component Value Date     05/12/2021    K 3.8 05/12/2021    CL 97 05/12/2021    CO2 27 05/12/2021    BUN 33 (H) 05/12/2021    CREATININE 1.24 (H) 05/12/2021    GLUCOSE 151 05/20/2021    CALCIUM 9.8 05/12/2021    PROT 8.0 06/07/2020    LABALBU 4.3 06/07/2020    BILITOT 0.5 06/07/2020    ALKPHOS 92 06/07/2020    AST 35 06/07/2020    ALT 61 (H) 06/07/2020    LABGLOM 58.3 (L) 05/12/2021    GFRAA >60.0 05/12/2021    GLOB 3.7 (H) 06/07/2020     Lab Results   Component Value Date    WBC 6.2 06/08/2020    HGB 12.3 (L) 06/08/2020    HCT 37.2 (L) 06/08/2020    MCV 87.7 06/08/2020     06/08/2020     Lab Results   Component Value Date    LABA1C 12.6 04/29/2021    LABA1C 10.6 (H) 06/07/2020    LABA1C 10.7 (H) 01/20/2020     Lab Results   Component Value Date    HDL 42 09/28/2018    LDLCALC 84 09/28/2018    CHOL 158 09/28/2018    TRIG 158 09/28/2018     Review of Systems   Eyes: Negative. Cardiovascular: Negative. Endocrine: Negative. All other systems reviewed and are negative. Objective:   Physical Exam  Vitals reviewed. Constitutional:       Appearance: Normal appearance. He is obese. HENT:      Head: Normocephalic and atraumatic. Right Ear: External ear normal.      Left Ear: External ear normal.      Nose: Nose normal.   Eyes:      General: No scleral icterus. Right eye: No discharge. Left eye: No discharge. Extraocular Movements: Extraocular movements intact.       Conjunctiva/sclera: Conjunctivae normal.   Neck:      Trachea: Trachea normal.   Cardiovascular:      Rate and Rhythm: Normal rate. Pulmonary:      Effort: Pulmonary effort is normal.   Musculoskeletal:         General: Normal range of motion. Cervical back: Normal range of motion and neck supple. Neurological:      General: No focal deficit present. Mental Status: He is alert and oriented to person, place, and time.    Psychiatric:         Mood and Affect: Mood normal.         Behavior: Behavior normal.

## 2021-05-21 RX ORDER — ATORVASTATIN CALCIUM 40 MG/1
40 TABLET, FILM COATED ORAL DAILY
Qty: 90 TABLET | Refills: 1 | Status: SHIPPED | OUTPATIENT
Start: 2021-05-21 | End: 2022-05-05

## 2021-05-21 RX ORDER — SITAGLIPTIN 100 MG/1
TABLET, FILM COATED ORAL
Qty: 90 TABLET | Refills: 1 | Status: SHIPPED | OUTPATIENT
Start: 2021-05-21

## 2021-05-21 RX ORDER — LISINOPRIL AND HYDROCHLOROTHIAZIDE 25; 20 MG/1; MG/1
TABLET ORAL
Qty: 90 TABLET | Refills: 1 | Status: SHIPPED | OUTPATIENT
Start: 2021-05-21 | End: 2022-03-28 | Stop reason: ALTCHOICE

## 2021-05-30 ASSESSMENT — ENCOUNTER SYMPTOMS: EYES NEGATIVE: 1

## 2021-07-12 DIAGNOSIS — E11.42 DIABETIC POLYNEUROPATHY ASSOCIATED WITH TYPE 2 DIABETES MELLITUS (HCC): ICD-10-CM

## 2021-09-23 DIAGNOSIS — E11.9 TYPE 2 DIABETES MELLITUS WITHOUT COMPLICATION, WITHOUT LONG-TERM CURRENT USE OF INSULIN (HCC): ICD-10-CM

## 2021-09-23 RX ORDER — INSULIN LISPRO 100 [IU]/ML
INJECTION, SUSPENSION SUBCUTANEOUS
Qty: 15 PEN | Refills: 3 | Status: SHIPPED | OUTPATIENT
Start: 2021-09-23 | End: 2021-09-27 | Stop reason: SDUPTHER

## 2021-09-23 RX ORDER — CHLORTHALIDONE 25 MG/1
25 TABLET ORAL DAILY
Qty: 30 TABLET | Refills: 3 | Status: SHIPPED | OUTPATIENT
Start: 2021-09-23 | End: 2022-03-03 | Stop reason: SDUPTHER

## 2021-09-23 NOTE — TELEPHONE ENCOUNTER
Rx request   Requested Prescriptions     Pending Prescriptions Disp Refills    chlorthalidone (HYGROTON) 25 MG tablet 30 tablet 3     Sig: Take 1 tablet by mouth daily    insulin lispro protamine & lispro (HUMALOG MIX 75/25 KWIKPEN) (75-25) 100 UNIT per ML SUPN injection pen 15 pen 3     Si  units am 30 units lunch and 50 units dinner     LOV 2021  Next Visit Date:  Future Appointments   Date Time Provider Zita Crespo   2021  2:45 PM Ashley Martel MD [facility]

## 2021-09-27 ENCOUNTER — OFFICE VISIT (OUTPATIENT)
Dept: ENDOCRINOLOGY | Age: 66
End: 2021-09-27
Payer: MEDICARE

## 2021-09-27 VITALS
OXYGEN SATURATION: 96 % | HEART RATE: 80 BPM | DIASTOLIC BLOOD PRESSURE: 89 MMHG | BODY MASS INDEX: 32.64 KG/M2 | SYSTOLIC BLOOD PRESSURE: 148 MMHG | WEIGHT: 234 LBS

## 2021-09-27 DIAGNOSIS — E11.9 TYPE 2 DIABETES MELLITUS WITHOUT COMPLICATION, WITHOUT LONG-TERM CURRENT USE OF INSULIN (HCC): Primary | ICD-10-CM

## 2021-09-27 LAB
CHP ED QC CHECK: NORMAL
GLUCOSE BLD-MCNC: 253 MG/DL

## 2021-09-27 PROCEDURE — 3046F HEMOGLOBIN A1C LEVEL >9.0%: CPT | Performed by: INTERNAL MEDICINE

## 2021-09-27 PROCEDURE — 2022F DILAT RTA XM EVC RTNOPTHY: CPT | Performed by: INTERNAL MEDICINE

## 2021-09-27 PROCEDURE — 3017F COLORECTAL CA SCREEN DOC REV: CPT | Performed by: INTERNAL MEDICINE

## 2021-09-27 PROCEDURE — 4040F PNEUMOC VAC/ADMIN/RCVD: CPT | Performed by: INTERNAL MEDICINE

## 2021-09-27 PROCEDURE — 1123F ACP DISCUSS/DSCN MKR DOCD: CPT | Performed by: INTERNAL MEDICINE

## 2021-09-27 PROCEDURE — G8427 DOCREV CUR MEDS BY ELIG CLIN: HCPCS | Performed by: INTERNAL MEDICINE

## 2021-09-27 PROCEDURE — G8417 CALC BMI ABV UP PARAM F/U: HCPCS | Performed by: INTERNAL MEDICINE

## 2021-09-27 PROCEDURE — 82962 GLUCOSE BLOOD TEST: CPT | Performed by: INTERNAL MEDICINE

## 2021-09-27 PROCEDURE — 1036F TOBACCO NON-USER: CPT | Performed by: INTERNAL MEDICINE

## 2021-09-27 PROCEDURE — 99213 OFFICE O/P EST LOW 20 MIN: CPT | Performed by: INTERNAL MEDICINE

## 2021-09-27 RX ORDER — INSULIN LISPRO 100 [IU]/ML
INJECTION, SUSPENSION SUBCUTANEOUS
Qty: 15 PEN | Refills: 3 | Status: SHIPPED | OUTPATIENT
Start: 2021-09-27 | End: 2021-12-27 | Stop reason: SDUPTHER

## 2021-09-27 NOTE — PROGRESS NOTES
2021    Assessment:       Diagnosis Orders   1. Type 2 diabetes mellitus without complication, without long-term current use of insulin ([facility])  POCT Glucose    Hemoglobin W9Q    Basic Metabolic Panel, Fasting         PLAN:     Orders Placed This Encounter   Procedures    Hemoglobin A1C     Standing Status:   Future     Standing Expiration Date:   2022    Basic Metabolic Panel, Fasting     Standing Status:   Future     Standing Expiration Date:   2022    POCT Glucose       Increase 75/25 Humalog dose  Continue current dose of Lantus  A1c goal of 7 or lower  Orders Placed This Encounter   Medications    insulin lispro protamine & lispro (HUMALOG MIX 75/25 KWIKPEN) (75-25) 100 UNIT per ML SUPN injection pen     Si  units am 35 units lunch and 55 units dinner     Dispense:  15 pen     Refill:  3    DISCONTD: Insulin Pen Needle (NOVOFINE) 32G X 6 MM MISC     Sig: tid     Dispense:  100 each     Refill:  3    Insulin Pen Needle (NOVOFINE) 32G X 6 MM MISC     Sig: Qid updated     Dispense:  300 each     Refill:  3         Orders Placed This Encounter   Procedures    POCT Glucose       Subjective:     Chief Complaint   Patient presents with    Diabetes     Vitals:    21 1514   BP: (!) 148/89   Pulse: 80   SpO2: 96%   Weight: 234 lb (106.1 kg)     Wt Readings from Last 3 Encounters:   21 234 lb (106.1 kg)   21 243 lb (110.2 kg)   21 240 lb (108.9 kg)     BP Readings from Last 3 Encounters:   21 (!) 148/89   21 139/89   21 132/89     Follow-up on type 2 diabetes patient's A1c has improved to 9.2 from 12.6 before currently on Lantus 40 at night Humalog 75/25 3 times daily 50 units in the morning 30 at lunch 50 at dinnertime  Patient also on Metformin and Januvia  Average blood sugar in the low 200 range    Diabetes  He presents for his follow-up diabetic visit. He has type 2 diabetes mellitus. There are no hypoglycemic complications. Symptoms are improving. Current or Ex-Partner:     Emotionally Abused:     Physically Abused:     Sexually Abused:      No family history on file.   Allergies   Allergen Reactions    Losartan        Current Outpatient Medications:     chlorthalidone (HYGROTON) 25 MG tablet, Take 1 tablet by mouth daily, Disp: 30 tablet, Rfl: 3    insulin lispro protamine & lispro (HUMALOG MIX 75/25 KWIKPEN) (75-25) 100 UNIT per ML SUPN injection pen, 50  units am 30 units lunch and 50 units dinner, Disp: 15 pen, Rfl: 3    metFORMIN (GLUCOPHAGE) 1000 MG tablet, TAKE 1 TABLET BY MOUTH TWICE DAILY WITH MEALS, Disp: 180 tablet, Rfl: 3    Insulin Pen Needle (NOVOFINE) 32G X 6 MM MISC, USE AS DIRECTED TWICE DAILY, Disp: 100 each, Rfl: 3    JANUVIA 100 MG tablet, TAKE 1 TABLET BY MOUTH DAILY, Disp: 90 tablet, Rfl: 1    lisinopril-hydroCHLOROthiazide (PRINZIDE;ZESTORETIC) 20-25 MG per tablet, TAKE 1 TABLET BY MOUTH EVERY DAY, Disp: 90 tablet, Rfl: 1    atorvastatin (LIPITOR) 40 MG tablet, TAKE 1 TABLET BY MOUTH DAILY, Disp: 90 tablet, Rfl: 1    LANTUS SOLOSTAR 100 UNIT/ML injection pen, 40 UNITS AT BEDTIME, Disp: 15 pen, Rfl: 03    lisinopril (PRINIVIL;ZESTRIL) 20 MG tablet, Take 1 tablet by mouth daily, Disp: 90 tablet, Rfl: 1    hydrALAZINE (APRESOLINE) 50 MG tablet, Take 1 tablet by mouth every 8 hours, Disp: 90 tablet, Rfl: 3    vitamin D (ERGOCALCIFEROL) 1.25 MG (60235 UT) CAPS capsule, , Disp: , Rfl:     metoprolol succinate (TOPROL XL) 50 MG extended release tablet, TK 1 T PO D WITH SUPPER, Disp: , Rfl:     omega-3 acid ethyl esters (LOVAZA) 1 g capsule, , Disp: , Rfl:     potassium chloride (KLOR-CON) 10 MEQ extended release tablet, TK 1 T PO QD WITH TATA, Disp: , Rfl:     aspirin 81 MG tablet, Take by mouth, Disp: , Rfl:     Alcohol Swabs (ALCOHOL PREP) 70 % PADS, , Disp: , Rfl:     Blood Glucose Monitoring Suppl (ONE TOUCH ULTRA 2) w/Device KIT, , Disp: , Rfl:     ONE TOUCH ULTRA TEST strip, , Disp: , Rfl:     AQUALANCE LANCETS 30G MISC, , Disp: , Rfl:     amLODIPine (NORVASC) 5 MG tablet, TAKE 1 TABLET BY MOUTH DAILY, Disp: 90 tablet, Rfl: 3    Multiple Vitamin (MVI, CELEBRATE, CHEWABLE TABLET), Take 1 tablet by mouth daily, Disp: 30 tablet, Rfl: 06  Lab Results   Component Value Date     09/17/2021    K 3.7 09/17/2021    CL 98 09/17/2021    CO2 27 09/17/2021    BUN 22 09/17/2021    CREATININE 1.20 09/17/2021    GLUCOSE 253 09/27/2021    CALCIUM 9.2 09/17/2021    PROT 8.0 06/07/2020    LABALBU 4.3 06/07/2020    BILITOT 0.5 06/07/2020    ALKPHOS 92 06/07/2020    AST 35 06/07/2020    ALT 61 (H) 06/07/2020    LABGLOM >60.0 09/17/2021    GFRAA >60.0 09/17/2021    GLOB 3.7 (H) 06/07/2020     Lab Results   Component Value Date    WBC 6.2 06/08/2020    HGB 12.3 (L) 06/08/2020    HCT 37.2 (L) 06/08/2020    MCV 87.7 06/08/2020     06/08/2020     Lab Results   Component Value Date    LABA1C 9.2 (H) 09/17/2021    LABA1C 12.6 04/29/2021    LABA1C 10.6 (H) 06/07/2020     Lab Results   Component Value Date    CHOLFAST 95 09/17/2021    TRIGLYCFAST 107 09/17/2021    HDL 32 (L) 09/17/2021    HDL 42 09/28/2018    LDLCALC 42 09/17/2021    LDLCALC 84 09/28/2018    CHOL 158 09/28/2018    TRIG 158 09/28/2018     No results found for: TESTM  No results found for: TSH, TSHREFLEX, FT3, T4FREE  No results found for: TPOABS    Review of Systems   Musculoskeletal: Positive for arthralgias. Radicular symptoms left lower extremity   All other systems reviewed and are negative. Objective:   Physical Exam  Vitals reviewed. Constitutional:       Appearance: Normal appearance. He is obese. HENT:      Head: Normocephalic and atraumatic. Hair is normal.      Right Ear: External ear normal.      Left Ear: External ear normal.      Nose: Nose normal.   Eyes:      General: No scleral icterus. Right eye: No discharge. Left eye: No discharge. Extraocular Movements: Extraocular movements intact.       Conjunctiva/sclera: Conjunctivae normal.   Neck:      Trachea: Trachea normal.   Cardiovascular:      Rate and Rhythm: Normal rate. Pulmonary:      Effort: Pulmonary effort is normal.   Musculoskeletal:         General: Normal range of motion. Cervical back: Normal range of motion and neck supple. Neurological:      General: No focal deficit present. Mental Status: He is alert and oriented to person, place, and time.    Psychiatric:         Mood and Affect: Mood normal.         Behavior: Behavior normal.

## 2021-10-29 DIAGNOSIS — E11.9 TYPE 2 DIABETES MELLITUS WITHOUT COMPLICATION, WITHOUT LONG-TERM CURRENT USE OF INSULIN (HCC): Primary | ICD-10-CM

## 2021-12-27 ENCOUNTER — OFFICE VISIT (OUTPATIENT)
Dept: ENDOCRINOLOGY | Age: 66
End: 2021-12-27
Payer: MEDICARE

## 2021-12-27 VITALS
HEART RATE: 73 BPM | DIASTOLIC BLOOD PRESSURE: 99 MMHG | SYSTOLIC BLOOD PRESSURE: 171 MMHG | BODY MASS INDEX: 34.16 KG/M2 | HEIGHT: 71 IN | WEIGHT: 244 LBS

## 2021-12-27 DIAGNOSIS — E11.9 TYPE 2 DIABETES MELLITUS WITHOUT COMPLICATION, WITHOUT LONG-TERM CURRENT USE OF INSULIN (HCC): Primary | ICD-10-CM

## 2021-12-27 LAB
CHP ED QC CHECK: NORMAL
GLUCOSE BLD-MCNC: 220 MG/DL

## 2021-12-27 PROCEDURE — 99213 OFFICE O/P EST LOW 20 MIN: CPT | Performed by: INTERNAL MEDICINE

## 2021-12-27 PROCEDURE — 1036F TOBACCO NON-USER: CPT | Performed by: INTERNAL MEDICINE

## 2021-12-27 PROCEDURE — G8484 FLU IMMUNIZE NO ADMIN: HCPCS | Performed by: INTERNAL MEDICINE

## 2021-12-27 PROCEDURE — 1123F ACP DISCUSS/DSCN MKR DOCD: CPT | Performed by: INTERNAL MEDICINE

## 2021-12-27 PROCEDURE — 2022F DILAT RTA XM EVC RTNOPTHY: CPT | Performed by: INTERNAL MEDICINE

## 2021-12-27 PROCEDURE — G8417 CALC BMI ABV UP PARAM F/U: HCPCS | Performed by: INTERNAL MEDICINE

## 2021-12-27 PROCEDURE — G8427 DOCREV CUR MEDS BY ELIG CLIN: HCPCS | Performed by: INTERNAL MEDICINE

## 2021-12-27 PROCEDURE — 82962 GLUCOSE BLOOD TEST: CPT | Performed by: INTERNAL MEDICINE

## 2021-12-27 PROCEDURE — 3017F COLORECTAL CA SCREEN DOC REV: CPT | Performed by: INTERNAL MEDICINE

## 2021-12-27 PROCEDURE — 4040F PNEUMOC VAC/ADMIN/RCVD: CPT | Performed by: INTERNAL MEDICINE

## 2021-12-27 RX ORDER — INSULIN LISPRO 100 [IU]/ML
INJECTION, SUSPENSION SUBCUTANEOUS
Qty: 15 PEN | Refills: 3 | Status: SHIPPED | OUTPATIENT
Start: 2021-12-27 | End: 2022-03-28 | Stop reason: SDUPTHER

## 2021-12-27 NOTE — PROGRESS NOTES
2021    Assessment:       Diagnosis Orders   1. Type 2 diabetes mellitus without complication, without long-term current use of insulin ([facility])  POCT Glucose         PLAN:     Increased dose of insulin  Continue current dose of Lantus  Hemoglobin A1c goal of 7 or lower  Orders Placed This Encounter   Medications    insulin lispro protamine & lispro (HUMALOG MIX 75/25 KWIKPEN) (75-25) 100 UNIT per ML SUPN injection pen     Si  units am 40 units lunch and 65 units dinner     Dispense:  15 pen     Refill:  3    Insulin Pen Needle 32G X 4 MM MISC     Si each by Does not apply route 3 times daily     Dispense:  200 each     Refill:  3         Orders Placed This Encounter   Procedures    POCT Glucose     Subjective:     Chief Complaint   Patient presents with    Diabetes     Vitals:    21 1435 21 1442   BP: (!) 165/109 (!) 171/99   Site: Left Upper Arm    Position: Sitting    Cuff Size: Large Adult    Pulse: 73    Weight: 244 lb (110.7 kg)    Height: 5' 11\" (1.803 m)      Wt Readings from Last 3 Encounters:   21 244 lb (110.7 kg)   21 234 lb (106.1 kg)   21 243 lb (110.2 kg)     BP Readings from Last 3 Encounters:   21 (!) 171/99   21 (!) 148/89   21 139/89     Follow-up of type 2 diabetes patient is on 75/25 Humalog 3 times a day plus Lantus blood sugars have been fluctuating compliance has been variable with diet    Diabetes  He presents for his follow-up diabetic visit. He has type 2 diabetes mellitus. His disease course has been fluctuating. Associated symptoms include fatigue. Risk factors for coronary artery disease include obesity. Current diabetic treatment includes insulin injections. He is currently taking insulin pre-breakfast, pre-lunch, pre-dinner and at bedtime. His overall blood glucose range is >200 mg/dl.  (Lab Results       Component                Value               Date                       LABA1C                   9.2 (H) 09/17/2021            )     Past Medical History:   Diagnosis Date    Hyperlipidemia     Hypertension     Type 2 diabetes mellitus without complication ([facility] [address]      History reviewed. No pertinent surgical history. Social History     Socioeconomic History    Marital status:      Spouse name: Not on file    Number of children: Not on file    Years of education: Not on file    Highest education level: Not on file   Occupational History    Not on file   Tobacco Use    Smoking status: Never Smoker    Smokeless tobacco: Never Used   Vaping Use    Vaping Use: Never used   Substance and Sexual Activity    Alcohol use: Not Currently    Drug use: Never    Sexual activity: Not on file   Other Topics Concern    Not on file   Social History Narrative    Not on file     Social Determinants of Health     Financial Resource Strain:     Difficulty of Paying Living Expenses: Not on file   Food Insecurity:     Worried About Running Out of Food in the Last Year: Not on file    Henry of Food in the Last Year: Not on file   Transportation Needs:     Lack of Transportation (Medical): Not on file    Lack of Transportation (Non-Medical):  Not on file   Physical Activity:     Days of Exercise per Week: Not on file    Minutes of Exercise per Session: Not on file   Stress:     Feeling of Stress : Not on file   Social Connections:     Frequency of Communication with Friends and Family: Not on file    Frequency of Social Gatherings with Friends and Family: Not on file    Attends [employer] Services: Not on file    Active Member of Clubs or Organizations: Not on file    Attends Club or Organization Meetings: Not on file    Marital Status: Not on file   Intimate Partner Violence:     Fear of Current or Ex-Partner: Not on file    Emotionally Abused: Not on file    Physically Abused: Not on file    Sexually Abused: Not on file   Housing Stability:     Unable to Pay for Housing in the Last Year: Not on file  Number of Places Lived in the Last Year: Not on file    Unstable Housing in the Last Year: Not on file     History reviewed. No pertinent family history.   Allergies   Allergen Reactions    Losartan        Current Outpatient Medications:     insulin lispro protamine & lispro (HUMALOG MIX 75/25 KWIKPEN) (75-25) 100 UNIT per ML SUPN injection pen, 55  units am 35 units lunch and 55 units dinner, Disp: 15 pen, Rfl: 3    Insulin Pen Needle (NOVOFINE) 32G X 6 MM MISC, Qid updated, Disp: 300 each, Rfl: 3    chlorthalidone (HYGROTON) 25 MG tablet, Take 1 tablet by mouth daily, Disp: 30 tablet, Rfl: 3    metFORMIN (GLUCOPHAGE) 1000 MG tablet, TAKE 1 TABLET BY MOUTH TWICE DAILY WITH MEALS, Disp: 180 tablet, Rfl: 3    JANUVIA 100 MG tablet, TAKE 1 TABLET BY MOUTH DAILY, Disp: 90 tablet, Rfl: 1    lisinopril-hydroCHLOROthiazide (PRINZIDE;ZESTORETIC) 20-25 MG per tablet, TAKE 1 TABLET BY MOUTH EVERY DAY, Disp: 90 tablet, Rfl: 1    atorvastatin (LIPITOR) 40 MG tablet, TAKE 1 TABLET BY MOUTH DAILY, Disp: 90 tablet, Rfl: 1    LANTUS SOLOSTAR 100 UNIT/ML injection pen, 40 UNITS AT BEDTIME, Disp: 15 pen, Rfl: 03    lisinopril (PRINIVIL;ZESTRIL) 20 MG tablet, Take 1 tablet by mouth daily, Disp: 90 tablet, Rfl: 1    hydrALAZINE (APRESOLINE) 50 MG tablet, Take 1 tablet by mouth every 8 hours, Disp: 90 tablet, Rfl: 3    vitamin D (ERGOCALCIFEROL) 1.25 MG (38663 UT) CAPS capsule, , Disp: , Rfl:     metoprolol succinate (TOPROL XL) 50 MG extended release tablet, TK 1 T PO D WITH SUPPER, Disp: , Rfl:     omega-3 acid ethyl esters (LOVAZA) 1 g capsule, , Disp: , Rfl:     potassium chloride (KLOR-CON) 10 MEQ extended release tablet, TK 1 T PO QD WITH TATA, Disp: , Rfl:     aspirin 81 MG tablet, Take by mouth, Disp: , Rfl:     Alcohol Swabs (ALCOHOL PREP) 70 % PADS, , Disp: , Rfl:     Blood Glucose Monitoring Suppl (ONE TOUCH ULTRA 2) w/Device KIT, , Disp: , Rfl:     ONE TOUCH ULTRA TEST strip, , Disp: , Rfl:    AQUALANCE LANCETS 30G MISC, , Disp: , Rfl:     amLODIPine (NORVASC) 5 MG tablet, TAKE 1 TABLET BY MOUTH DAILY, Disp: 90 tablet, Rfl: 3    Multiple Vitamin (MVI, CELEBRATE, CHEWABLE TABLET), Take 1 tablet by mouth daily, Disp: 30 tablet, Rfl: 06  Lab Results   Component Value Date     09/17/2021    K 3.7 09/17/2021    CL 98 09/17/2021    CO2 27 09/17/2021    BUN 22 09/17/2021    CREATININE 1.20 09/17/2021    GLUCOSE 220 12/27/2021    CALCIUM 9.2 09/17/2021    PROT 8.0 06/07/2020    LABALBU 4.3 06/07/2020    BILITOT 0.5 06/07/2020    ALKPHOS 92 06/07/2020    AST 35 06/07/2020    ALT 61 (H) 06/07/2020    LABGLOM >60.0 09/17/2021    GFRAA >60.0 09/17/2021    GLOB 3.7 (H) 06/07/2020     Lab Results   Component Value Date    WBC 6.2 06/08/2020    HGB 12.3 (L) 06/08/2020    HCT 37.2 (L) 06/08/2020    MCV 87.7 06/08/2020     06/08/2020     Lab Results   Component Value Date    LABA1C 9.2 (H) 09/17/2021    LABA1C 12.6 04/29/2021    LABA1C 10.6 (H) 06/07/2020     Lab Results   Component Value Date    CHOLFAST 95 09/17/2021    TRIGLYCFAST 107 09/17/2021    HDL 32 (L) 09/17/2021    HDL 42 09/28/2018    LDLCALC 42 09/17/2021    LDLCALC 84 09/28/2018    CHOL 158 09/28/2018    TRIG 158 09/28/2018     No results found for: TESTM  No results found for: TSH, TSHREFLEX, FT3, T4FREE  No results found for: TPOABS    Review of Systems   Constitutional: Positive for fatigue. Cardiovascular: Negative. Endocrine: Negative. Objective:   Physical Exam  Vitals reviewed. Constitutional:       Appearance: Normal appearance. He is obese. HENT:      Head: Normocephalic and atraumatic. Hair is normal.      Right Ear: External ear normal.      Left Ear: External ear normal.      Nose: Nose normal.   Eyes:      General: No scleral icterus. Right eye: No discharge. Left eye: No discharge. Extraocular Movements: Extraocular movements intact.       Conjunctiva/sclera: Conjunctivae normal.   Neck: Trachea: Trachea normal.   Cardiovascular:      Rate and Rhythm: Normal rate. Pulmonary:      Effort: Pulmonary effort is normal.   Musculoskeletal:         General: Normal range of motion. Cervical back: Normal range of motion and neck supple. Neurological:      General: No focal deficit present. Mental Status: He is alert and oriented to person, place, and time.    Psychiatric:         Mood and Affect: Mood normal.         Behavior: Behavior normal.

## 2022-01-25 NOTE — TELEPHONE ENCOUNTER
Patient  requesting medication refill.  Please approve or deny this request.    Rx requested:  Requested Prescriptions     Pending Prescriptions Disp Refills    Insulin Pen Needle 32G X 6 MM MISC       Sig: by Does not apply route         Last Office Visit:   12/27/2021      Next Visit Date:  Future Appointments   Date Time Provider Zita Crespo   3/28/2022  1:00 PM Alexa Donohue MD [facility]

## 2022-03-03 RX ORDER — CHLORTHALIDONE 25 MG/1
25 TABLET ORAL DAILY
Qty: 30 TABLET | Refills: 3 | Status: SHIPPED | OUTPATIENT
Start: 2022-03-03

## 2022-03-03 NOTE — TELEPHONE ENCOUNTER
Pharmacy requesting medication refill.  Please approve or deny this request.    Rx requested:  Requested Prescriptions     Pending Prescriptions Disp Refills    chlorthalidone (HYGROTON) 25 MG tablet 30 tablet 3     Sig: Take 1 tablet by mouth daily         Last Office Visit:   12/27/2021      Next Visit Date:  Future Appointments   Date Time Provider Zita Crespo   3/28/2022  1:00 PM Danny Connelly  [address]

## 2022-03-28 ENCOUNTER — OFFICE VISIT (OUTPATIENT)
Dept: ENDOCRINOLOGY | Age: 67
End: 2022-03-28
Payer: MEDICARE

## 2022-03-28 VITALS
OXYGEN SATURATION: 95 % | BODY MASS INDEX: 33.32 KG/M2 | HEART RATE: 84 BPM | WEIGHT: 238 LBS | DIASTOLIC BLOOD PRESSURE: 86 MMHG | SYSTOLIC BLOOD PRESSURE: 170 MMHG | HEIGHT: 71 IN

## 2022-03-28 DIAGNOSIS — E11.9 TYPE 2 DIABETES MELLITUS WITHOUT COMPLICATION, WITHOUT LONG-TERM CURRENT USE OF INSULIN (HCC): Primary | ICD-10-CM

## 2022-03-28 DIAGNOSIS — I10 PRIMARY HYPERTENSION: ICD-10-CM

## 2022-03-28 LAB
CHP ED QC CHECK: NORMAL
GLUCOSE BLD-MCNC: 206 MG/DL
HBA1C MFR BLD: 9.3 %

## 2022-03-28 PROCEDURE — 4040F PNEUMOC VAC/ADMIN/RCVD: CPT | Performed by: INTERNAL MEDICINE

## 2022-03-28 PROCEDURE — 1036F TOBACCO NON-USER: CPT | Performed by: INTERNAL MEDICINE

## 2022-03-28 PROCEDURE — 83036 HEMOGLOBIN GLYCOSYLATED A1C: CPT | Performed by: INTERNAL MEDICINE

## 2022-03-28 PROCEDURE — G8484 FLU IMMUNIZE NO ADMIN: HCPCS | Performed by: INTERNAL MEDICINE

## 2022-03-28 PROCEDURE — 3017F COLORECTAL CA SCREEN DOC REV: CPT | Performed by: INTERNAL MEDICINE

## 2022-03-28 PROCEDURE — G8427 DOCREV CUR MEDS BY ELIG CLIN: HCPCS | Performed by: INTERNAL MEDICINE

## 2022-03-28 PROCEDURE — 2022F DILAT RTA XM EVC RTNOPTHY: CPT | Performed by: INTERNAL MEDICINE

## 2022-03-28 PROCEDURE — 1123F ACP DISCUSS/DSCN MKR DOCD: CPT | Performed by: INTERNAL MEDICINE

## 2022-03-28 PROCEDURE — 82962 GLUCOSE BLOOD TEST: CPT | Performed by: INTERNAL MEDICINE

## 2022-03-28 PROCEDURE — G8417 CALC BMI ABV UP PARAM F/U: HCPCS | Performed by: INTERNAL MEDICINE

## 2022-03-28 PROCEDURE — 99213 OFFICE O/P EST LOW 20 MIN: CPT | Performed by: INTERNAL MEDICINE

## 2022-03-28 PROCEDURE — 3046F HEMOGLOBIN A1C LEVEL >9.0%: CPT | Performed by: INTERNAL MEDICINE

## 2022-03-28 RX ORDER — INSULIN LISPRO 100 [IU]/ML
INJECTION, SUSPENSION SUBCUTANEOUS
Qty: 15 PEN | Refills: 3 | Status: SHIPPED | OUTPATIENT
Start: 2022-03-28 | End: 2022-09-27 | Stop reason: SDUPTHER

## 2022-03-28 RX ORDER — AMLODIPINE BESYLATE 5 MG/1
5 TABLET ORAL DAILY
Qty: 90 TABLET | Refills: 3 | Status: SHIPPED | OUTPATIENT
Start: 2022-03-28

## 2022-03-28 ASSESSMENT — ENCOUNTER SYMPTOMS: EYES NEGATIVE: 1

## 2022-03-28 NOTE — PROGRESS NOTES
3/28/2022    Assessment:       Diagnosis Orders   1. Type 2 diabetes mellitus without complication, without long-term current use of insulin ([facility])  POCT Glucose    POCT glycosylated hemoglobin (Hb A1C)    Basic Metabolic Panel, Fasting    Hemoglobin A1C    insulin lispro protamine & lispro (HUMALOG MIX 75/25 KWIKPEN) (75-25) 100 UNIT per ML SUPN injection pen    Insulin Pen Needle 32G X 6 MM MISC   2. Primary hypertension           PLAN:     Continue current dose of lisinopril hydrochlorothiazide Norvasc for hypertension blood pressure goal 130/80 continue current dose of insulin lower carb intake to lose weight follow-up in 3 months  Orders Placed This Encounter   Procedures    Basic Metabolic Panel, Fasting     Standing Status:   Future     Standing Expiration Date:   3/28/2023    Hemoglobin A1C     Standing Status:   Future     Standing Expiration Date:   3/28/2023    POCT Glucose    POCT glycosylated hemoglobin (Hb A1C)           Orders Placed This Encounter   Procedures    POCT Glucose    POCT glycosylated hemoglobin (Hb A1C)     Subjective:     Chief Complaint   Patient presents with    Diabetes     Vitals:    03/28/22 1308   BP: (!) 185/86   Pulse: 84   SpO2: 95%   Weight: 238 lb (108 kg)   Height: 5' 11\" (1.803 m)     Wt Readings from Last 3 Encounters:   03/28/22 238 lb (108 kg)   12/27/21 244 lb (110.7 kg)   09/27/21 234 lb (106.1 kg)     BP Readings from Last 3 Encounters:   03/28/22 (!) 185/86   12/27/21 (!) 171/99   09/27/21 (!) 148/89     Follow-up with type 2 diabetes patient is on Lantus at night 75/25 Humalog 3 times daily and Metformin and Januvia A1c is still high at 9.3   hypertension blood pressure still uncontrolled currently on lisinopril hydrochlorothiazide was on Norvasc before denies any hypoglycemia obesity BMI 33    Diabetes  He presents for his follow-up diabetic visit. He has type 2 diabetes mellitus. Symptoms are stable. Diabetic complications include nephropathy.  Risk factors Not on file    Active Member of Clubs or Organizations: Not on file    Attends Club or Organization Meetings: Not on file    Marital Status: Not on file   Intimate Partner Violence:     Fear of Current or Ex-Partner: Not on file    Emotionally Abused: Not on file    Physically Abused: Not on file    Sexually Abused: Not on file   Housing Stability:     Unable to Pay for Housing in the Last Year: Not on file    Number of [place] in the Last Year: Not on file    Unstable Housing in the Last Year: Not on file     No family history on file.   Allergies   Allergen Reactions    Losartan        Current Outpatient Medications:     chlorthalidone (HYGROTON) 25 MG tablet, Take 1 tablet by mouth daily, Disp: 30 tablet, Rfl: 3    Insulin Pen Needle 32G X 6 MM MISC, Tid, Disp: 100 each, Rfl: 5    insulin lispro protamine & lispro (HUMALOG MIX 75/25 KWIKPEN) (75-25) 100 UNIT per ML SUPN injection pen, 65  units am 40 units lunch and 65 units dinner, Disp: 15 pen, Rfl: 3    Insulin Pen Needle 32G X 4 MM MISC, 1 each by Does not apply route 3 times daily, Disp: 200 each, Rfl: 3    Insulin Pen Needle (NOVOFINE) 32G X 6 MM MISC, Qid updated, Disp: 300 each, Rfl: 3    metFORMIN (GLUCOPHAGE) 1000 MG tablet, TAKE 1 TABLET BY MOUTH TWICE DAILY WITH MEALS, Disp: 180 tablet, Rfl: 3    JANUVIA 100 MG tablet, TAKE 1 TABLET BY MOUTH DAILY, Disp: 90 tablet, Rfl: 1    lisinopril-hydroCHLOROthiazide (PRINZIDE;ZESTORETIC) 20-25 MG per tablet, TAKE 1 TABLET BY MOUTH EVERY DAY, Disp: 90 tablet, Rfl: 1    atorvastatin (LIPITOR) 40 MG tablet, TAKE 1 TABLET BY MOUTH DAILY, Disp: 90 tablet, Rfl: 1    LANTUS SOLOSTAR 100 UNIT/ML injection pen, 40 UNITS AT BEDTIME, Disp: 15 pen, Rfl: 03    lisinopril (PRINIVIL;ZESTRIL) 20 MG tablet, Take 1 tablet by mouth daily, Disp: 90 tablet, Rfl: 1    hydrALAZINE (APRESOLINE) 50 MG tablet, Take 1 tablet by mouth every 8 hours, Disp: 90 tablet, Rfl: 3    vitamin D (ERGOCALCIFEROL) 1.25 MG (02161 UT) CAPS capsule, , Disp: , Rfl:     metoprolol succinate (TOPROL XL) 50 MG extended release tablet, TK 1 T PO D WITH SUPPER, Disp: , Rfl:     omega-3 acid ethyl esters (LOVAZA) 1 g capsule, , Disp: , Rfl:     potassium chloride (KLOR-CON) 10 MEQ extended release tablet, TK 1 T PO QD WITH TATA, Disp: , Rfl:     aspirin 81 MG tablet, Take by mouth, Disp: , Rfl:     Alcohol Swabs (ALCOHOL PREP) 70 % PADS, , Disp: , Rfl:     Blood Glucose Monitoring Suppl (ONE TOUCH ULTRA 2) w/Device KIT, , Disp: , Rfl:     ONE TOUCH ULTRA TEST strip, , Disp: , Rfl:     AQUALANCE LANCETS 30G MISC, , Disp: , Rfl:     amLODIPine (NORVASC) 5 MG tablet, TAKE 1 TABLET BY MOUTH DAILY, Disp: 90 tablet, Rfl: 3    Multiple Vitamin (MVI, CELEBRATE, CHEWABLE TABLET), Take 1 tablet by mouth daily, Disp: 30 tablet, Rfl: 06  Lab Results   Component Value Date     09/17/2021    K 3.7 09/17/2021    CL 98 09/17/2021    CO2 27 09/17/2021    BUN 22 09/17/2021    CREATININE 1.20 09/17/2021    GLUCOSE 206 03/28/2022    CALCIUM 9.2 09/17/2021    PROT 8.0 06/07/2020    LABALBU 4.3 06/07/2020    BILITOT 0.5 06/07/2020    ALKPHOS 92 06/07/2020    AST 35 06/07/2020    ALT 61 (H) 06/07/2020    LABGLOM >60.0 09/17/2021    GFRAA >60.0 09/17/2021    GLOB 3.7 (H) 06/07/2020     Lab Results   Component Value Date    WBC 6.2 06/08/2020    HGB 12.3 (L) 06/08/2020    HCT 37.2 (L) 06/08/2020    MCV 87.7 06/08/2020     06/08/2020     Lab Results   Component Value Date    LABA1C 9.3 03/28/2022    LABA1C 9.2 (H) 09/17/2021    LABA1C 12.6 04/29/2021     Lab Results   Component Value Date    CHOLFAST 95 09/17/2021    TRIGLYCFAST 107 09/17/2021    HDL 32 (L) 09/17/2021    HDL 42 09/28/2018    LDLCALC 42 09/17/2021    LDLCALC 84 09/28/2018    CHOL 158 09/28/2018    TRIG 158 09/28/2018     No results found for: TESTM  No results found for: TSH, TSHREFLEX, FT3, T4FREE  No results found for: TPOABS    Review of Systems   Eyes: Negative. Cardiovascular: Negative. Endocrine: Negative. All other systems reviewed and are negative. Objective:   Physical Exam  Vitals reviewed. Constitutional:       Appearance: Normal appearance. He is obese. HENT:      Head: Normocephalic and atraumatic. Right Ear: External ear normal.      Left Ear: External ear normal.      Nose: Nose normal.   Eyes:      General: No scleral icterus. Right eye: No discharge. Left eye: No discharge. Extraocular Movements: Extraocular movements intact. Conjunctiva/sclera: Conjunctivae normal.   Cardiovascular:      Rate and Rhythm: Normal rate. Pulmonary:      Effort: Pulmonary effort is normal.   Musculoskeletal:         General: Normal range of motion. Cervical back: Normal range of motion and neck supple. Neurological:      General: No focal deficit present. Mental Status: He is alert and oriented to person, place, and time.    Psychiatric:         Mood and Affect: Mood normal.         Behavior: Behavior normal.

## 2022-05-05 RX ORDER — INSULIN LISPRO 100 [IU]/ML
INJECTION, SOLUTION INTRAVENOUS; SUBCUTANEOUS
Qty: 30 ML | Refills: 3 | Status: SHIPPED | OUTPATIENT
Start: 2022-05-05

## 2022-05-05 RX ORDER — ATORVASTATIN CALCIUM 40 MG/1
40 TABLET, FILM COATED ORAL DAILY
Qty: 30 TABLET | Refills: 3 | Status: SHIPPED | OUTPATIENT
Start: 2022-05-05 | End: 2022-09-07

## 2022-05-05 RX ORDER — BLOOD SUGAR DIAGNOSTIC
STRIP MISCELLANEOUS
Qty: 150 EACH | Refills: 3 | Status: SHIPPED | OUTPATIENT
Start: 2022-05-05

## 2022-05-05 RX ORDER — LANCETS
EACH MISCELLANEOUS
Qty: 150 EACH | Refills: 3 | Status: SHIPPED | OUTPATIENT
Start: 2022-05-05

## 2022-06-28 ENCOUNTER — OFFICE VISIT (OUTPATIENT)
Dept: ENDOCRINOLOGY | Age: 67
End: 2022-06-28
Payer: MEDICARE

## 2022-06-28 VITALS
HEART RATE: 66 BPM | DIASTOLIC BLOOD PRESSURE: 95 MMHG | OXYGEN SATURATION: 94 % | WEIGHT: 240 LBS | SYSTOLIC BLOOD PRESSURE: 172 MMHG | BODY MASS INDEX: 33.47 KG/M2

## 2022-06-28 DIAGNOSIS — E11.69 TYPE 2 DIABETES MELLITUS WITH OTHER SPECIFIED COMPLICATION, WITH LONG-TERM CURRENT USE OF INSULIN (HCC): Primary | ICD-10-CM

## 2022-06-28 DIAGNOSIS — Z79.4 TYPE 2 DIABETES MELLITUS WITH OTHER SPECIFIED COMPLICATION, WITH LONG-TERM CURRENT USE OF INSULIN (HCC): Primary | ICD-10-CM

## 2022-06-28 LAB
CHP ED QC CHECK: ABNORMAL
GLUCOSE BLD-MCNC: 236 MG/DL
HBA1C MFR BLD: 8.6 %

## 2022-06-28 PROCEDURE — 3052F HG A1C>EQUAL 8.0%<EQUAL 9.0%: CPT | Performed by: INTERNAL MEDICINE

## 2022-06-28 PROCEDURE — 3017F COLORECTAL CA SCREEN DOC REV: CPT | Performed by: INTERNAL MEDICINE

## 2022-06-28 PROCEDURE — 2022F DILAT RTA XM EVC RTNOPTHY: CPT | Performed by: INTERNAL MEDICINE

## 2022-06-28 PROCEDURE — G8427 DOCREV CUR MEDS BY ELIG CLIN: HCPCS | Performed by: INTERNAL MEDICINE

## 2022-06-28 PROCEDURE — 99213 OFFICE O/P EST LOW 20 MIN: CPT | Performed by: INTERNAL MEDICINE

## 2022-06-28 PROCEDURE — 1123F ACP DISCUSS/DSCN MKR DOCD: CPT | Performed by: INTERNAL MEDICINE

## 2022-06-28 PROCEDURE — G8417 CALC BMI ABV UP PARAM F/U: HCPCS | Performed by: INTERNAL MEDICINE

## 2022-06-28 PROCEDURE — 1036F TOBACCO NON-USER: CPT | Performed by: INTERNAL MEDICINE

## 2022-06-28 PROCEDURE — 82962 GLUCOSE BLOOD TEST: CPT | Performed by: INTERNAL MEDICINE

## 2022-06-28 PROCEDURE — 83036 HEMOGLOBIN GLYCOSYLATED A1C: CPT | Performed by: INTERNAL MEDICINE

## 2022-06-28 NOTE — PROGRESS NOTES
6/28/2022    Assessment:       Diagnosis Orders   1. Type 2 diabetes mellitus with other specified complication, with long-term current use of insulin (HCC)           PLAN:     Continue current dose of Lantus plus 75/25 Humalog to patient starts insulin pump therapy long-term A1c goal of 7 or lower repeat labs  Orders Placed This Encounter   Procedures    Basic Metabolic Panel     Standing Status:   Future     Standing Expiration Date:   6/28/2023    Hemoglobin A1C     Standing Status:   Future     Standing Expiration Date:   6/28/2023    POCT Glucose    POCT glycosylated hemoglobin (Hb A1C)         Orders Placed This Encounter   Procedures    POCT Glucose    POCT glycosylated hemoglobin (Hb A1C)       Subjective:     Chief Complaint   Patient presents with    Diabetes    Hypertension     Vitals:    06/28/22 1115 06/28/22 1122   BP: (!) 176/90 (!) 172/95   Site: Right Upper Arm Left Upper Arm   Position: Sitting Sitting   Cuff Size: Large Adult Large Adult   Pulse: 66    SpO2: 94%    Weight: 240 lb (108.9 kg)      Wt Readings from Last 3 Encounters:   06/28/22 240 lb (108.9 kg)   03/28/22 238 lb (108 kg)   12/27/21 244 lb (110.7 kg)     BP Readings from Last 3 Encounters:   06/28/22 (!) 172/95   03/28/22 (!) 170/86   12/27/21 (!) 171/99     Follow-up on type 2 diabetes patient recently started on insulin pump has discontinued pump due to different reason has back history of is back on Lantus 40 units at bedtime +7525 hemoglobin 65 units in the morning 40 at lunch 65 at dinnertime plus metformin A1c was down to 8.6 patient is to resume insulin pump is also getting through family member        Diabetes  He presents for his follow-up diabetic visit. He has type 2 diabetes mellitus. Symptoms are improving. Risk factors for coronary artery disease include obesity. Current diabetic treatment includes insulin injections. He is currently taking insulin pre-breakfast, pre-lunch, pre-dinner and at bedtime.  He participates in exercise intermittently. His overall blood glucose range is >200 mg/dl. (Lab Results       Component                Value               Date                       LABA1C                   8.6                 06/28/2022              ) An ACE inhibitor/angiotensin II receptor blocker is being taken. Past Medical History:   Diagnosis Date    Hyperlipidemia     Hypertension     Type 2 diabetes mellitus without complication ([address]      No past surgical history on file. Social History     Socioeconomic History    Marital status:      Spouse name: Not on file    Number of children: Not on file    Years of education: Not on file    Highest education level: Not on file   Occupational History    Not on file   Tobacco Use    Smoking status: Never Smoker    Smokeless tobacco: Never Used   Vaping Use    Vaping Use: Never used   Substance and Sexual Activity    Alcohol use: Not Currently    Drug use: Never    Sexual activity: Not on file   Other Topics Concern    Not on file   Social History Narrative    Not on file     Social Determinants of Health     Financial Resource Strain:     Difficulty of Paying Living Expenses: Not on file   Food Insecurity:     Worried About Running Out of Food in the Last Year: Not on file    Henry of Food in the Last Year: Not on file   Transportation Needs:     Lack of Transportation (Medical): Not on file    Lack of Transportation (Non-Medical):  Not on file   Physical Activity:     Days of Exercise per Week: Not on file    Minutes of Exercise per Session: Not on file   Stress:     Feeling of Stress : Not on file   Social Connections:     Frequency of Communication with Friends and Family: Not on file    Frequency of Social Gatherings with Friends and Family: Not on file    Attends [employer] Services: Not on file    Active Member of Clubs or Organizations: Not on file    Attends Club or Organization Meetings: Not on file    Marital Status: Not on file   Intimate Partner Violence:     Fear of Current or Ex-Partner: Not on file    Emotionally Abused: Not on file    Physically Abused: Not on file    Sexually Abused: Not on file   Housing Stability:     Unable to Pay for Housing in the Last Year: Not on file    Number of [place] in the Last Year: Not on file    Unstable Housing in the Last Year: Not on file     No family history on file.   Allergies   Allergen Reactions    Losartan        Current Outpatient Medications:     atorvastatin (LIPITOR) 40 MG tablet, TAKE 1 TABLET BY MOUTH DAILY, Disp: 30 tablet, Rfl: 3    insulin lispro (HUMALOG) 100 UNIT/ML SOLN injection vial, Use via insulin pump max daily dose 100 units, Disp: 30 mL, Rfl: 3    Accu-Chek FastClix Lancets MISC, Test 4x daily, Disp: 150 each, Rfl: 3    blood glucose test strips (ACCU-CHEK GUIDE) strip, Test 4x daily, Disp: 150 each, Rfl: 3    insulin lispro protamine & lispro (HUMALOG MIX 75/25 KWIKPEN) (75-25) 100 UNIT per ML SUPN injection pen, 65  units am 40 units lunch and 65 units dinner, Disp: 15 pen, Rfl: 3    Insulin Pen Needle 32G X 6 MM MISC, Tid, Disp: 100 each, Rfl: 5    amLODIPine (NORVASC) 5 MG tablet, Take 1 tablet by mouth daily, Disp: 90 tablet, Rfl: 3    chlorthalidone (HYGROTON) 25 MG tablet, Take 1 tablet by mouth daily, Disp: 30 tablet, Rfl: 3    Insulin Pen Needle 32G X 4 MM MISC, 1 each by Does not apply route 3 times daily, Disp: 200 each, Rfl: 3    Insulin Pen Needle (NOVOFINE) 32G X 6 MM MISC, Qid updated, Disp: 300 each, Rfl: 3    metFORMIN (GLUCOPHAGE) 1000 MG tablet, TAKE 1 TABLET BY MOUTH TWICE DAILY WITH MEALS, Disp: 180 tablet, Rfl: 3    JANUVIA 100 MG tablet, TAKE 1 TABLET BY MOUTH DAILY, Disp: 90 tablet, Rfl: 1    LANTUS SOLOSTAR 100 UNIT/ML injection pen, 40 UNITS AT BEDTIME, Disp: 15 pen, Rfl: 03    lisinopril (PRINIVIL;ZESTRIL) 20 MG tablet, Take 1 tablet by mouth daily, Disp: 90 tablet, Rfl: 1    hydrALAZINE (APRESOLINE) 50 MG tablet, Take 1 tablet by mouth every 8 hours, Disp: 90 tablet, Rfl: 3    vitamin D (ERGOCALCIFEROL) 1.25 MG (72915 UT) CAPS capsule, , Disp: , Rfl:     metoprolol succinate (TOPROL XL) 50 MG extended release tablet, TK 1 T PO D WITH SUPPER, Disp: , Rfl:     omega-3 acid ethyl esters (LOVAZA) 1 g capsule, , Disp: , Rfl:     potassium chloride (KLOR-CON) 10 MEQ extended release tablet, TK 1 T PO QD WITH TATA, Disp: , Rfl:     aspirin 81 MG tablet, Take by mouth, Disp: , Rfl:     Alcohol Swabs (ALCOHOL PREP) 70 % PADS, , Disp: , Rfl:     Blood Glucose Monitoring Suppl (ONE TOUCH ULTRA 2) w/Device KIT, , Disp: , Rfl:     ONE TOUCH ULTRA TEST strip, , Disp: , Rfl:     AQUALANCE LANCETS 30G MISC, , Disp: , Rfl:     Multiple Vitamin (MVI, CELEBRATE, CHEWABLE TABLET), Take 1 tablet by mouth daily, Disp: 30 tablet, Rfl: 06  Lab Results   Component Value Date     09/17/2021    K 3.7 09/17/2021    CL 98 09/17/2021    CO2 27 09/17/2021    BUN 22 09/17/2021    CREATININE 1.20 09/17/2021    GLUCOSE 236 (H) 06/28/2022    CALCIUM 9.2 09/17/2021    PROT 8.0 06/07/2020    LABALBU 4.3 06/07/2020    BILITOT 0.5 06/07/2020    ALKPHOS 92 06/07/2020    AST 35 06/07/2020    ALT 61 (H) 06/07/2020    LABGLOM >60.0 09/17/2021    GFRAA >60.0 09/17/2021    GLOB 3.7 (H) 06/07/2020     Lab Results   Component Value Date    WBC 6.2 06/08/2020    HGB 12.3 (L) 06/08/2020    HCT 37.2 (L) 06/08/2020    MCV 87.7 06/08/2020     06/08/2020     Lab Results   Component Value Date    LABA1C 8.6 06/28/2022    LABA1C 9.3 03/28/2022    LABA1C 9.2 (H) 09/17/2021     Lab Results   Component Value Date    CHOLFAST 95 09/17/2021    TRIGLYCFAST 107 09/17/2021    HDL 32 (L) 09/17/2021    HDL 42 09/28/2018    LDLCALC 42 09/17/2021    LDLCALC 84 09/28/2018    CHOL 158 09/28/2018    TRIG 158 09/28/2018     No results found for: TESTM  No results found for: TSH, TSHREFLEX, FT3, T4FREE  No results found for: TPOABS    Review of Systems Cardiovascular: Negative. Endocrine: Negative. All other systems reviewed and are negative. Objective:   Physical Exam  Vitals reviewed. Constitutional:       General: He is not in acute distress. Appearance: Normal appearance. He is obese. HENT:      Head: Normocephalic and atraumatic. Right Ear: External ear normal.      Left Ear: External ear normal.      Nose: Nose normal.   Eyes:      General: No scleral icterus. Right eye: No discharge. Left eye: No discharge. Extraocular Movements: Extraocular movements intact. Conjunctiva/sclera: Conjunctivae normal.   Cardiovascular:      Rate and Rhythm: Normal rate. Pulmonary:      Effort: Pulmonary effort is normal.   Musculoskeletal:         General: Normal range of motion. Cervical back: Normal range of motion and neck supple. Neurological:      General: No focal deficit present. Mental Status: He is alert and oriented to person, place, and time.    Psychiatric:         Mood and Affect: Mood normal.         Behavior: Behavior normal.

## 2022-07-25 RX ORDER — PEN NEEDLE, DIABETIC 31 GX3/16"
NEEDLE, DISPOSABLE MISCELLANEOUS
Qty: 120 EACH | Refills: 3 | Status: SHIPPED | OUTPATIENT
Start: 2022-07-25

## 2022-09-07 RX ORDER — ATORVASTATIN CALCIUM 40 MG/1
40 TABLET, FILM COATED ORAL DAILY
Qty: 30 TABLET | Refills: 3 | Status: SHIPPED | OUTPATIENT
Start: 2022-09-07

## 2022-09-20 DIAGNOSIS — E11.69 TYPE 2 DIABETES MELLITUS WITH OTHER SPECIFIED COMPLICATION, WITH LONG-TERM CURRENT USE OF INSULIN (HCC): ICD-10-CM

## 2022-09-20 DIAGNOSIS — Z79.4 TYPE 2 DIABETES MELLITUS WITH OTHER SPECIFIED COMPLICATION, WITH LONG-TERM CURRENT USE OF INSULIN (HCC): ICD-10-CM

## 2022-09-20 LAB
ANION GAP SERPL CALCULATED.3IONS-SCNC: 12 MEQ/L (ref 9–15)
BUN BLDV-MCNC: 18 MG/DL (ref 8–23)
CALCIUM SERPL-MCNC: 9.5 MG/DL (ref 8.5–9.9)
CHLORIDE BLD-SCNC: 102 MEQ/L (ref 95–107)
CO2: 27 MEQ/L (ref 20–31)
CREAT SERPL-MCNC: 1.09 MG/DL (ref 0.7–1.2)
GFR AFRICAN AMERICAN: >60
GFR NON-AFRICAN AMERICAN: >60
GLUCOSE BLD-MCNC: 224 MG/DL (ref 70–99)
HBA1C MFR BLD: 9 % (ref 4.8–5.9)
POTASSIUM SERPL-SCNC: 4.4 MEQ/L (ref 3.4–4.9)
SODIUM BLD-SCNC: 141 MEQ/L (ref 135–144)

## 2022-09-27 ENCOUNTER — OFFICE VISIT (OUTPATIENT)
Dept: ENDOCRINOLOGY | Age: 67
End: 2022-09-27
Payer: MEDICARE

## 2022-09-27 VITALS
BODY MASS INDEX: 33.88 KG/M2 | HEIGHT: 71 IN | OXYGEN SATURATION: 95 % | WEIGHT: 242 LBS | DIASTOLIC BLOOD PRESSURE: 87 MMHG | SYSTOLIC BLOOD PRESSURE: 160 MMHG | HEART RATE: 68 BPM

## 2022-09-27 DIAGNOSIS — E11.69 TYPE 2 DIABETES MELLITUS WITH OTHER SPECIFIED COMPLICATION, WITH LONG-TERM CURRENT USE OF INSULIN (HCC): Primary | ICD-10-CM

## 2022-09-27 DIAGNOSIS — Z79.4 TYPE 2 DIABETES MELLITUS WITH OTHER SPECIFIED COMPLICATION, WITH LONG-TERM CURRENT USE OF INSULIN (HCC): Primary | ICD-10-CM

## 2022-09-27 DIAGNOSIS — E11.9 TYPE 2 DIABETES MELLITUS WITHOUT COMPLICATION, WITHOUT LONG-TERM CURRENT USE OF INSULIN (HCC): ICD-10-CM

## 2022-09-27 LAB
CHP ED QC CHECK: NORMAL
GLUCOSE BLD-MCNC: 230 MG/DL

## 2022-09-27 PROCEDURE — 2022F DILAT RTA XM EVC RTNOPTHY: CPT | Performed by: INTERNAL MEDICINE

## 2022-09-27 PROCEDURE — 82962 GLUCOSE BLOOD TEST: CPT | Performed by: INTERNAL MEDICINE

## 2022-09-27 PROCEDURE — 3017F COLORECTAL CA SCREEN DOC REV: CPT | Performed by: INTERNAL MEDICINE

## 2022-09-27 PROCEDURE — 1036F TOBACCO NON-USER: CPT | Performed by: INTERNAL MEDICINE

## 2022-09-27 PROCEDURE — G8427 DOCREV CUR MEDS BY ELIG CLIN: HCPCS | Performed by: INTERNAL MEDICINE

## 2022-09-27 PROCEDURE — 1123F ACP DISCUSS/DSCN MKR DOCD: CPT | Performed by: INTERNAL MEDICINE

## 2022-09-27 PROCEDURE — G8417 CALC BMI ABV UP PARAM F/U: HCPCS | Performed by: INTERNAL MEDICINE

## 2022-09-27 PROCEDURE — 99213 OFFICE O/P EST LOW 20 MIN: CPT | Performed by: INTERNAL MEDICINE

## 2022-09-27 PROCEDURE — 3046F HEMOGLOBIN A1C LEVEL >9.0%: CPT | Performed by: INTERNAL MEDICINE

## 2022-09-27 RX ORDER — INSULIN LISPRO 100 [IU]/ML
INJECTION, SUSPENSION SUBCUTANEOUS
Qty: 15 ADJUSTABLE DOSE PRE-FILLED PEN SYRINGE | Refills: 3 | Status: SHIPPED | OUTPATIENT
Start: 2022-09-27

## 2022-09-27 RX ORDER — INSULIN LISPRO 100 [IU]/ML
INJECTION, SOLUTION INTRAVENOUS; SUBCUTANEOUS
Qty: 30 ML | Refills: 3 | Status: CANCELLED | OUTPATIENT
Start: 2022-09-27

## 2022-09-27 NOTE — PROGRESS NOTES
2022    Assessment:       Diagnosis Orders   1. Type 2 diabetes mellitus with other specified complication, with long-term current use of insulin ([facility])  POCT Glucose            PLAN:     Orders Placed This Encounter   Procedures    Hemoglobin A1C     Standing Status:   Future     Standing Expiration Date:       Basic Metabolic Panel, Fasting     Standing Status:   Future     Standing Expiration Date:   2023    Lipid, Fasting     Standing Status:   Future     Standing Expiration Date:   2023    POCT Glucose     DIABETES FOOT EXAM     Continue current dose of Lantus 40+ Humalog insulin A1c goal of 8 or lower  Compliance stressed with diet  Orders Placed This Encounter   Medications    insulin lispro protamine & lispro (HUMALOG MIX 75/25 KWIKPEN) (75-25) 100 UNIT per ML SUPN injection pen     Si  units am 40 units lunch and 65 units dinner     Dispense:  15 Adjustable Dose Pre-filled Pen Syringe     Refill:  3    Insulin Pen Needle 32G X 6 MM MISC     Sig: Tid     Dispense:  100 each     Refill:  5         Orders Placed This Encounter   Procedures    POCT Glucose       Subjective:     Chief Complaint   Patient presents with    Diabetes     Vitals:    22 1047 22 1051   BP: (!) 168/87 (!) 160/87   Pulse: 68    SpO2: 95%    Weight: 242 lb (109.8 kg)    Height: 5' 11\" (1.803 m)      Wt Readings from Last 3 Encounters:   22 242 lb (109.8 kg)   22 240 lb (108.9 kg)   22 238 lb (108 kg)     BP Readings from Last 3 Encounters:   22 (!) 160/87   22 (!) 172/95   22 (!) 170/86     Follow-up on type 2 diabetes obesity patient on Lantus at bedtime plus Humalog 75/25 3 times a day A1c was 9.0  Was on insulin pump before wants to stay on insulin pen injections  Overall blood sugars close to 220 not compliant with diet testing    Diabetes  He presents for his follow-up diabetic visit. He has type 2 diabetes mellitus. His disease course has been worsening. Pertinent negatives for diabetes include no polyuria and no weight loss. Risk factors for coronary artery disease include obesity. Current diabetic treatment includes insulin injections. He is currently taking insulin pre-breakfast, pre-lunch, pre-dinner and at bedtime. His overall blood glucose range is >200 mg/dl. (Lab Results       Component                Value               Date                       LABA1C                   9.0 (H)             09/20/2022            )   Past Medical History:   Diagnosis Date    Hyperlipidemia     Hypertension     Type 2 diabetes mellitus without complication ([facility] [address]      No past surgical history on file. Social History     Socioeconomic History    Marital status:      Spouse name: Not on file    Number of children: Not on file    Years of education: Not on file    Highest education level: Not on file   Occupational History    Not on file   Tobacco Use    Smoking status: Never    Smokeless tobacco: Never   Vaping Use    Vaping Use: Never used   Substance and Sexual Activity    Alcohol use: Not Currently    Drug use: Never    Sexual activity: Not on file   Other Topics Concern    Not on file   Social History Narrative    Not on file     Social Determinants of Health     Financial Resource Strain: Not on file   Food Insecurity: Not on file   Transportation Needs: Not on file   Physical Activity: Not on file   Stress: Not on file   Social Connections: Not on file   Intimate Partner Violence: Not on file   Housing Stability: Not on file     No family history on file.   Allergies   Allergen Reactions    Losartan        Current Outpatient Medications:     atorvastatin (LIPITOR) 40 MG tablet, TAKE 1 TABLET BY MOUTH DAILY, Disp: 30 tablet, Rfl: 3    TECHLITE PEN NEEDLES 31G X 5 MM MISC, USE AS DIRECTED, Disp: 120 each, Rfl: 3    insulin lispro (HUMALOG) 100 UNIT/ML SOLN injection vial, Use via insulin pump max daily dose 100 units, Disp: 30 mL, Rfl: 3    Accu-Chek FastClix Lancets MISC, Test 4x daily, Disp: 150 each, Rfl: 3    blood glucose test strips (ACCU-CHEK GUIDE) strip, Test 4x daily, Disp: 150 each, Rfl: 3    insulin lispro protamine & lispro (HUMALOG MIX 75/25 KWIKPEN) (75-25) 100 UNIT per ML SUPN injection pen, 65  units am 40 units lunch and 65 units dinner, Disp: 15 pen, Rfl: 3    Insulin Pen Needle 32G X 6 MM MISC, Tid, Disp: 100 each, Rfl: 5    amLODIPine (NORVASC) 5 MG tablet, Take 1 tablet by mouth daily, Disp: 90 tablet, Rfl: 3    chlorthalidone (HYGROTON) 25 MG tablet, Take 1 tablet by mouth daily, Disp: 30 tablet, Rfl: 3    Insulin Pen Needle 32G X 4 MM MISC, 1 each by Does not apply route 3 times daily, Disp: 200 each, Rfl: 3    Insulin Pen Needle (NOVOFINE) 32G X 6 MM MISC, Qid updated, Disp: 300 each, Rfl: 3    metFORMIN (GLUCOPHAGE) 1000 MG tablet, TAKE 1 TABLET BY MOUTH TWICE DAILY WITH MEALS, Disp: 180 tablet, Rfl: 3    JANUVIA 100 MG tablet, TAKE 1 TABLET BY MOUTH DAILY, Disp: 90 tablet, Rfl: 1    LANTUS SOLOSTAR 100 UNIT/ML injection pen, 40 UNITS AT BEDTIME, Disp: 15 pen, Rfl: 03    lisinopril (PRINIVIL;ZESTRIL) 20 MG tablet, Take 1 tablet by mouth daily, Disp: 90 tablet, Rfl: 1    hydrALAZINE (APRESOLINE) 50 MG tablet, Take 1 tablet by mouth every 8 hours, Disp: 90 tablet, Rfl: 3    vitamin D (ERGOCALCIFEROL) 1.25 MG (62930 UT) CAPS capsule, , Disp: , Rfl:     metoprolol succinate (TOPROL XL) 50 MG extended release tablet, TK 1 T PO D WITH SUPPER, Disp: , Rfl:     omega-3 acid ethyl esters (LOVAZA) 1 g capsule, , Disp: , Rfl:     potassium chloride (KLOR-CON) 10 MEQ extended release tablet, TK 1 T PO QD WITH TATA, Disp: , Rfl:     aspirin 81 MG tablet, Take by mouth, Disp: , Rfl:     Alcohol Swabs (ALCOHOL PREP) 70 % PADS, , Disp: , Rfl:     Blood Glucose Monitoring Suppl (ONE TOUCH ULTRA 2) w/Device KIT, , Disp: , Rfl:     ONE TOUCH ULTRA TEST strip, , Disp: , Rfl:     AQUALANCE LANCETS 30G MISC, , Disp: , Rfl:     Multiple Vitamin (MVI, CELEBRATE, CHEWABLE TABLET), Take 1 tablet by mouth daily, Disp: 30 tablet, Rfl: 06  Lab Results   Component Value Date     09/20/2022    K 4.4 09/20/2022     09/20/2022    CO2 27 09/20/2022    BUN 18 09/20/2022    CREATININE 1.09 09/20/2022    GLUCOSE 230 09/27/2022    CALCIUM 9.5 09/20/2022    PROT 8.0 06/07/2020    LABALBU 4.3 06/07/2020    BILITOT 0.5 06/07/2020    ALKPHOS 92 06/07/2020    AST 35 06/07/2020    ALT 61 (H) 06/07/2020    LABGLOM >60.0 09/20/2022    GFRAA >60.0 09/20/2022    GLOB 3.7 (H) 06/07/2020     Lab Results   Component Value Date    WBC 6.2 06/08/2020    HGB 12.3 (L) 06/08/2020    HCT 37.2 (L) 06/08/2020    MCV 87.7 06/08/2020     06/08/2020     Lab Results   Component Value Date    LABA1C 9.0 (H) 09/20/2022    LABA1C 8.6 06/28/2022    LABA1C 9.3 03/28/2022     Lab Results   Component Value Date    CHOLFAST 95 09/17/2021    TRIGLYCFAST 107 09/17/2021    HDL 32 (L) 09/17/2021    HDL 42 09/28/2018    LDLCALC 42 09/17/2021    LDLCALC 84 09/28/2018    CHOL 158 09/28/2018    TRIG 158 09/28/2018     No results found for: TESTM  No results found for: TSH, TSHREFLEX, FT3, T4FREE  No results found for: TPOABS    Review of Systems   Constitutional:  Negative for weight loss. Endocrine: Negative for polyuria. Objective:   Physical Exam  Vitals reviewed. Constitutional:       General: He is not in acute distress. Appearance: Normal appearance. He is obese. HENT:      Head: Normocephalic and atraumatic. Right Ear: External ear normal.      Left Ear: External ear normal.      Nose: Nose normal.   Eyes:      General: No scleral icterus. Right eye: No discharge. Left eye: No discharge. Extraocular Movements: Extraocular movements intact. Conjunctiva/sclera: Conjunctivae normal.   Cardiovascular:      Rate and Rhythm: Normal rate. Pulmonary:      Effort: Pulmonary effort is normal.   Musculoskeletal:         General: Normal range of motion.       Cervical back: Normal range of motion and neck supple. Feet:    Neurological:      General: No focal deficit present. Mental Status: He is alert and oriented to person, place, and time.    Psychiatric:         Mood and Affect: Mood normal.         Behavior: Behavior normal.

## 2023-01-03 RX ORDER — PEN NEEDLE, DIABETIC 31 GX3/16"
NEEDLE, DISPOSABLE MISCELLANEOUS
Qty: 200 EACH | Refills: 3 | Status: SHIPPED | OUTPATIENT
Start: 2023-01-03

## 2023-01-20 ENCOUNTER — OFFICE VISIT (OUTPATIENT)
Dept: ENDOCRINOLOGY | Age: 68
End: 2023-01-20

## 2023-01-20 VITALS
SYSTOLIC BLOOD PRESSURE: 162 MMHG | HEART RATE: 85 BPM | OXYGEN SATURATION: 94 % | WEIGHT: 231 LBS | DIASTOLIC BLOOD PRESSURE: 97 MMHG | HEIGHT: 71 IN | BODY MASS INDEX: 32.34 KG/M2

## 2023-01-20 DIAGNOSIS — E11.69 TYPE 2 DIABETES MELLITUS WITH OTHER SPECIFIED COMPLICATION, WITH LONG-TERM CURRENT USE OF INSULIN (HCC): Primary | ICD-10-CM

## 2023-01-20 DIAGNOSIS — Z79.4 TYPE 2 DIABETES MELLITUS WITH OTHER SPECIFIED COMPLICATION, WITH LONG-TERM CURRENT USE OF INSULIN (HCC): Primary | ICD-10-CM

## 2023-01-20 LAB
CHP ED QC CHECK: ABNORMAL
GLUCOSE BLD-MCNC: 306 MG/DL
HBA1C MFR BLD: 8.7 %

## 2023-01-20 RX ORDER — ATORVASTATIN CALCIUM 40 MG/1
40 TABLET, FILM COATED ORAL DAILY
Qty: 30 TABLET | Refills: 3 | Status: SHIPPED | OUTPATIENT
Start: 2023-01-20

## 2023-01-20 RX ORDER — CHOLECALCIFEROL (VITAMIN D3) 25 MCG
TABLET,CHEWABLE ORAL
COMMUNITY
Start: 2022-10-21

## 2023-01-20 NOTE — PROGRESS NOTES
1/20/2023    Assessment:       Diagnosis Orders   1. Type 2 diabetes mellitus with other specified complication, with long-term current use of insulin (HCC)  POCT Glucose    POCT glycosylated hemoglobin (Hb A1C)            PLAN:     Orders Placed This Encounter   Procedures    Basic Metabolic Panel     Standing Status:   Future     Standing Expiration Date:   1/20/2024    Hemoglobin A1C     Standing Status:   Future     Standing Expiration Date:   1/20/2024    Lipid Panel     Standing Status:   Future     Standing Expiration Date:   1/20/2024     Order Specific Question:   Is Patient Fasting?/# of Hours     Answer:   10-12    Microalbumin / Creatinine Urine Ratio     Standing Status:   Future     Standing Expiration Date:   1/20/2024    POCT Glucose    POCT glycosylated hemoglobin (Hb A1C)     Continue current insulin regimen A1c goal of 7 or lower    Orders Placed This Encounter   Procedures    POCT Glucose    POCT glycosylated hemoglobin (Hb A1C)     No orders of the defined types were placed in this encounter. No follow-ups on file. Subjective:     Chief Complaint   Patient presents with    Diabetes     Vitals:    01/20/23 1443 01/20/23 1450   BP: (!) 176/116 (!) 162/97   Site: Left Upper Arm Left Upper Arm   Position: Sitting Sitting   Cuff Size: Large Adult Large Adult   Pulse: 85    SpO2: 94%    Weight: 231 lb (104.8 kg)    Height: 5' 11\" (1.803 m)      Wt Readings from Last 3 Encounters:   01/20/23 231 lb (104.8 kg)   09/27/22 242 lb (109.8 kg)   06/28/22 240 lb (108.9 kg)     BP Readings from Last 3 Encounters:   01/20/23 (!) 162/97   09/27/22 (!) 160/87   06/28/22 (!) 172/95     Follow-up with IV diabetes patient on 75/25 Humalog 65 units in the morning 40 at lunch 65 at dinnertime variable compliance with diet hemoglobin A1c slightly improved to 8.7 overall blood sugars close to 200 denies any hypoglycemia    Diabetes  He presents for his follow-up diabetic visit. He has type 2 diabetes mellitus. Pertinent negatives for diabetes include no polyuria and no weight loss. Symptoms are stable. Risk factors for coronary artery disease include obesity. He is currently taking insulin pre-breakfast, pre-lunch and pre-dinner. His overall blood glucose range is >200 mg/dl. (Hemoglobin A1C       Date                     Value               Ref Range           Status                01/20/2023               8.7 (H)             %                   Final            ----------  )   Past Medical History:   Diagnosis Date    Hyperlipidemia     Hypertension     Type 2 diabetes mellitus without complication ([address]      No past surgical history on file. Social History     Socioeconomic History    Marital status:      Spouse name: Not on file    Number of children: Not on file    Years of education: Not on file    Highest education level: Not on file   Occupational History    Not on file   Tobacco Use    Smoking status: Never    Smokeless tobacco: Never   Vaping Use    Vaping Use: Never used   Substance and Sexual Activity    Alcohol use: Not Currently    Drug use: Never    Sexual activity: Not on file   Other Topics Concern    Not on file   Social History Narrative    Not on file     Social Determinants of Health     Financial Resource Strain: Not on file   Food Insecurity: Not on file   Transportation Needs: Not on file   Physical Activity: Not on file   Stress: Not on file   Social Connections: Not on file   Intimate Partner Violence: Not on file   Housing Stability: Not on file     No family history on file. Allergies   Allergen Reactions    Losartan        Current Outpatient Medications:     Cyanocobalamin (B-12) 1000 MCG TBCR, TAKE 1 TABLET BY MOUTH WITH BREAKFAST AND 1 TABLET WITH LUNCH.  DO NOT TAKE WITH SUPPER., Disp: , Rfl:     SURE COMFORT PEN NEEDLES 31G X 5 MM MISC, USE AS DIRECTED TO INJECT insulin, Disp: 200 each, Rfl: 3    insulin lispro protamine & lispro (HUMALOG MIX 75/25 KWIKPEN) (75-25) 100 UNIT per ML SUPN injection pen, 65  units am 40 units lunch and 65 units dinner, Disp: 15 Adjustable Dose Pre-filled Pen Syringe, Rfl: 3    Insulin Pen Needle 32G X 6 MM MISC, Tid, Disp: 100 each, Rfl: 5    atorvastatin (LIPITOR) 40 MG tablet, TAKE 1 TABLET BY MOUTH DAILY, Disp: 30 tablet, Rfl: 3    insulin lispro (HUMALOG) 100 UNIT/ML SOLN injection vial, Use via insulin pump max daily dose 100 units, Disp: 30 mL, Rfl: 3    Accu-Chek FastClix Lancets MISC, Test 4x daily, Disp: 150 each, Rfl: 3    blood glucose test strips (ACCU-CHEK GUIDE) strip, Test 4x daily, Disp: 150 each, Rfl: 3    amLODIPine (NORVASC) 5 MG tablet, Take 1 tablet by mouth daily, Disp: 90 tablet, Rfl: 3    chlorthalidone (HYGROTON) 25 MG tablet, Take 1 tablet by mouth daily, Disp: 30 tablet, Rfl: 3    Insulin Pen Needle 32G X 4 MM MISC, 1 each by Does not apply route 3 times daily, Disp: 200 each, Rfl: 3    Insulin Pen Needle (NOVOFINE) 32G X 6 MM MISC, Qid updated, Disp: 300 each, Rfl: 3    metFORMIN (GLUCOPHAGE) 1000 MG tablet, TAKE 1 TABLET BY MOUTH TWICE DAILY WITH MEALS, Disp: 180 tablet, Rfl: 3    JANUVIA 100 MG tablet, TAKE 1 TABLET BY MOUTH DAILY, Disp: 90 tablet, Rfl: 1    LANTUS SOLOSTAR 100 UNIT/ML injection pen, 40 UNITS AT BEDTIME, Disp: 15 pen, Rfl: 03    lisinopril (PRINIVIL;ZESTRIL) 20 MG tablet, Take 1 tablet by mouth daily, Disp: 90 tablet, Rfl: 1    hydrALAZINE (APRESOLINE) 50 MG tablet, Take 1 tablet by mouth every 8 hours, Disp: 90 tablet, Rfl: 3    vitamin D (ERGOCALCIFEROL) 1.25 MG (20150 UT) CAPS capsule, , Disp: , Rfl:     metoprolol succinate (TOPROL XL) 50 MG extended release tablet, TK 1 T PO D WITH SUPPER, Disp: , Rfl:     omega-3 acid ethyl esters (LOVAZA) 1 g capsule, , Disp: , Rfl:     potassium chloride (KLOR-CON) 10 MEQ extended release tablet, TK 1 T PO QD WITH TATA, Disp: , Rfl:     aspirin 81 MG tablet, Take by mouth, Disp: , Rfl:     Alcohol Swabs (ALCOHOL PREP) 70 % PADS, , Disp: , Rfl:     Blood Glucose Monitoring Suppl (ONE TOUCH ULTRA 2) w/Device KIT, , Disp: , Rfl:     ONE TOUCH ULTRA TEST strip, , Disp: , Rfl:     AQUALANCE LANCETS 30G MISC, , Disp: , Rfl:     Multiple Vitamin (MVI, CELEBRATE, CHEWABLE TABLET), Take 1 tablet by mouth daily, Disp: 30 tablet, Rfl: 06  Lab Results   Component Value Date     09/20/2022    K 4.4 09/20/2022     09/20/2022    CO2 27 09/20/2022    BUN 18 09/20/2022    CREATININE 1.09 09/20/2022    GLUCOSE 306 (H) 01/20/2023    CALCIUM 9.5 09/20/2022    PROT 8.0 06/07/2020    LABALBU 4.3 06/07/2020    BILITOT 0.5 06/07/2020    ALKPHOS 92 06/07/2020    AST 35 06/07/2020    ALT 61 (H) 06/07/2020    LABGLOM >60.0 09/20/2022    GFRAA >60.0 09/20/2022    GLOB 3.7 (H) 06/07/2020     Lab Results   Component Value Date    WBC 6.2 06/08/2020    HGB 12.3 (L) 06/08/2020    HCT 37.2 (L) 06/08/2020    MCV 87.7 06/08/2020     06/08/2020     Lab Results   Component Value Date    LABA1C 8.7 (H) 01/20/2023    LABA1C 9.0 (H) 09/20/2022    LABA1C 8.6 06/28/2022     Lab Results   Component Value Date    CHOLFAST 95 09/17/2021    TRIGLYCFAST 107 09/17/2021    HDL 32 (L) 09/17/2021    HDL 42 09/28/2018    LDLCALC 42 09/17/2021    LDLCALC 84 09/28/2018    CHOL 158 09/28/2018    TRIG 158 09/28/2018     No results found for: TESTM  No results found for: TSH, TSHREFLEX, FT3, T4FREE  No results found for: TPOABS    Review of Systems   Constitutional:  Negative for weight loss. Eyes: Negative. Cardiovascular: Negative. Endocrine: Negative for polyuria. Neurological: Negative. All other systems reviewed and are negative. Objective:   Physical Exam  Vitals reviewed. Constitutional:       General: He is not in acute distress. Appearance: Normal appearance. He is obese. HENT:      Head: Normocephalic and atraumatic. Right Ear: External ear normal.      Left Ear: External ear normal.      Nose: Nose normal.   Eyes:      General: No scleral icterus. Right eye: No discharge. Left eye: No discharge. Extraocular Movements: Extraocular movements intact. Conjunctiva/sclera: Conjunctivae normal.   Cardiovascular:      Rate and Rhythm: Normal rate. Pulmonary:      Effort: Pulmonary effort is normal.   Abdominal:      Palpations: Abdomen is soft. Musculoskeletal:         General: Normal range of motion. Cervical back: Normal range of motion and neck supple. Neurological:      General: No focal deficit present. Mental Status: He is alert and oriented to person, place, and time.    Psychiatric:         Mood and Affect: Mood normal.         Behavior: Behavior normal.

## 2023-01-29 ASSESSMENT — ENCOUNTER SYMPTOMS: EYES NEGATIVE: 1

## 2023-02-06 DIAGNOSIS — Z79.4 TYPE 2 DIABETES MELLITUS WITH OTHER SPECIFIED COMPLICATION, WITH LONG-TERM CURRENT USE OF INSULIN (HCC): ICD-10-CM

## 2023-02-06 DIAGNOSIS — E11.69 TYPE 2 DIABETES MELLITUS WITH OTHER SPECIFIED COMPLICATION, WITH LONG-TERM CURRENT USE OF INSULIN (HCC): ICD-10-CM

## 2023-02-06 LAB
ANION GAP SERPL CALCULATED.3IONS-SCNC: 11 MEQ/L (ref 9–15)
BUN BLDV-MCNC: 19 MG/DL (ref 8–23)
CALCIUM SERPL-MCNC: 9.6 MG/DL (ref 8.5–9.9)
CHLORIDE BLD-SCNC: 101 MEQ/L (ref 95–107)
CHOLESTEROL, TOTAL: 104 MG/DL (ref 0–199)
CO2: 29 MEQ/L (ref 20–31)
CREAT SERPL-MCNC: 1.3 MG/DL (ref 0.7–1.2)
CREATININE URINE: 269.3 MG/DL
GFR SERPL CREATININE-BSD FRML MDRD: 59.8 ML/MIN/{1.73_M2}
GLUCOSE BLD-MCNC: 174 MG/DL (ref 70–99)
HBA1C MFR BLD: 8.1 % (ref 4.8–5.9)
HDLC SERPL-MCNC: 44 MG/DL (ref 40–59)
LDL CHOLESTEROL CALCULATED: 44 MG/DL (ref 0–129)
MICROALBUMIN UR-MCNC: 172.6 MG/DL
MICROALBUMIN/CREAT UR-RTO: 640.9 MG/G (ref 0–30)
POTASSIUM SERPL-SCNC: 4.4 MEQ/L (ref 3.4–4.9)
SODIUM BLD-SCNC: 141 MEQ/L (ref 135–144)
TRIGL SERPL-MCNC: 81 MG/DL (ref 0–150)

## 2023-04-25 ENCOUNTER — OFFICE VISIT (OUTPATIENT)
Dept: ENDOCRINOLOGY | Age: 68
End: 2023-04-25
Payer: MEDICARE

## 2023-04-25 VITALS
HEART RATE: 86 BPM | WEIGHT: 226 LBS | BODY MASS INDEX: 31.64 KG/M2 | SYSTOLIC BLOOD PRESSURE: 145 MMHG | DIASTOLIC BLOOD PRESSURE: 86 MMHG | OXYGEN SATURATION: 94 % | HEIGHT: 71 IN

## 2023-04-25 DIAGNOSIS — E11.69 TYPE 2 DIABETES MELLITUS WITH OTHER SPECIFIED COMPLICATION, WITH LONG-TERM CURRENT USE OF INSULIN (HCC): Primary | ICD-10-CM

## 2023-04-25 DIAGNOSIS — E11.9 TYPE 2 DIABETES MELLITUS WITHOUT COMPLICATION, WITHOUT LONG-TERM CURRENT USE OF INSULIN (HCC): ICD-10-CM

## 2023-04-25 DIAGNOSIS — E11.42 DIABETIC POLYNEUROPATHY ASSOCIATED WITH TYPE 2 DIABETES MELLITUS (HCC): ICD-10-CM

## 2023-04-25 DIAGNOSIS — Z79.4 TYPE 2 DIABETES MELLITUS WITH OTHER SPECIFIED COMPLICATION, WITH LONG-TERM CURRENT USE OF INSULIN (HCC): Primary | ICD-10-CM

## 2023-04-25 LAB
CHP ED QC CHECK: NORMAL
GLUCOSE BLD-MCNC: 276 MG/DL

## 2023-04-25 PROCEDURE — 1036F TOBACCO NON-USER: CPT | Performed by: INTERNAL MEDICINE

## 2023-04-25 PROCEDURE — 3017F COLORECTAL CA SCREEN DOC REV: CPT | Performed by: INTERNAL MEDICINE

## 2023-04-25 PROCEDURE — 1123F ACP DISCUSS/DSCN MKR DOCD: CPT | Performed by: INTERNAL MEDICINE

## 2023-04-25 PROCEDURE — 2022F DILAT RTA XM EVC RTNOPTHY: CPT | Performed by: INTERNAL MEDICINE

## 2023-04-25 PROCEDURE — 82962 GLUCOSE BLOOD TEST: CPT | Performed by: INTERNAL MEDICINE

## 2023-04-25 PROCEDURE — 3052F HG A1C>EQUAL 8.0%<EQUAL 9.0%: CPT | Performed by: INTERNAL MEDICINE

## 2023-04-25 PROCEDURE — 99213 OFFICE O/P EST LOW 20 MIN: CPT | Performed by: INTERNAL MEDICINE

## 2023-04-25 PROCEDURE — G8427 DOCREV CUR MEDS BY ELIG CLIN: HCPCS | Performed by: INTERNAL MEDICINE

## 2023-04-25 PROCEDURE — 3077F SYST BP >= 140 MM HG: CPT | Performed by: INTERNAL MEDICINE

## 2023-04-25 PROCEDURE — G8417 CALC BMI ABV UP PARAM F/U: HCPCS | Performed by: INTERNAL MEDICINE

## 2023-04-25 PROCEDURE — 3079F DIAST BP 80-89 MM HG: CPT | Performed by: INTERNAL MEDICINE

## 2023-04-25 RX ORDER — INSULIN LISPRO 100 [IU]/ML
INJECTION, SUSPENSION SUBCUTANEOUS
Qty: 15 ADJUSTABLE DOSE PRE-FILLED PEN SYRINGE | Refills: 3 | Status: SHIPPED | OUTPATIENT
Start: 2023-04-25

## 2023-04-25 ASSESSMENT — ENCOUNTER SYMPTOMS: EYES NEGATIVE: 1

## 2023-04-25 NOTE — PROGRESS NOTES
2023    Assessment:       Diagnosis Orders   1. Type 2 diabetes mellitus with other specified complication, with long-term current use of insulin ([facility])  POCT Glucose      2. Type 2 diabetes mellitus without complication, without long-term current use of insulin ([facility]        3. Diabetic polyneuropathy associated with type 2 diabetes mellitus ([facility])              PLAN:     Continue current insulin regimen patient to follow-up in 3 months A1c goal of 7 or lower patient wants to consider starting insulin pump again I will talk to [employer]  Orders Placed This Encounter   Procedures    Hemoglobin A1C     Standing Status:   Future     Standing Expiration Date:   3/98/1250    Basic Metabolic Panel     Standing Status:   Future     Standing Expiration Date:   2024    POCT Glucose     Orders Placed This Encounter   Medications    insulin lispro protamine & lispro (HUMALOG MIX 75/25 KWIKPEN) (75-25) 100 UNIT per ML SUPN injection pen     Si  units am 40 units lunch and 65 units dinner     Dispense:  15 Adjustable Dose Pre-filled Pen Syringe     Refill:  3    Insulin Pen Needle 32G X 4 MM MISC     Si each by Does not apply route 3 times daily     Dispense:  200 each     Refill:  3           Orders Placed This Encounter   Procedures    POCT Glucose     No orders of the defined types were placed in this encounter. No follow-ups on file.   Subjective:     Chief Complaint   Patient presents with    Diabetes     Vitals:    23 1031 23 1036   BP: (!) 152/89 (!) 145/86   Pulse: 86    SpO2: 94%    Weight: 226 lb (102.5 kg)    Height: 5' 11\" (1.803 m)      Wt Readings from Last 3 Encounters:   23 226 lb (102.5 kg)   23 231 lb (104.8 kg)   22 242 lb (109.8 kg)     BP Readings from Last 3 Encounters:   23 (!) 145/86   23 (!) 162/97   22 (!) 160/87     Follow-up on type 2 diabetes obesity diabetic neuropathy patient on insulin injections 3 times a day hemoglobin A1c has Gyn Onc

## 2023-07-25 ENCOUNTER — OFFICE VISIT (OUTPATIENT)
Dept: ENDOCRINOLOGY | Age: 68
End: 2023-07-25

## 2023-07-25 VITALS
OXYGEN SATURATION: 96 % | HEART RATE: 82 BPM | BODY MASS INDEX: 31.5 KG/M2 | WEIGHT: 225 LBS | DIASTOLIC BLOOD PRESSURE: 79 MMHG | HEIGHT: 71 IN | SYSTOLIC BLOOD PRESSURE: 155 MMHG

## 2023-07-25 DIAGNOSIS — Z79.4 TYPE 2 DIABETES MELLITUS WITH OTHER SPECIFIED COMPLICATION, WITH LONG-TERM CURRENT USE OF INSULIN (HCC): Primary | ICD-10-CM

## 2023-07-25 DIAGNOSIS — E11.69 TYPE 2 DIABETES MELLITUS WITH OTHER SPECIFIED COMPLICATION, WITH LONG-TERM CURRENT USE OF INSULIN (HCC): Primary | ICD-10-CM

## 2023-07-25 DIAGNOSIS — E11.9 TYPE 2 DIABETES MELLITUS WITHOUT COMPLICATION, WITHOUT LONG-TERM CURRENT USE OF INSULIN (HCC): ICD-10-CM

## 2023-07-25 LAB
CHP ED QC CHECK: NORMAL
GLUCOSE BLD-MCNC: 162 MG/DL
HBA1C MFR BLD: 8.2 %

## 2023-07-25 RX ORDER — LISINOPRIL 20 MG/1
20 TABLET ORAL DAILY
Qty: 90 TABLET | Refills: 1 | Status: SHIPPED | OUTPATIENT
Start: 2023-07-25

## 2023-07-25 RX ORDER — INSULIN GLARGINE 100 [IU]/ML
INJECTION, SOLUTION SUBCUTANEOUS
Qty: 15 ADJUSTABLE DOSE PRE-FILLED PEN SYRINGE | Refills: 3 | Status: SHIPPED | OUTPATIENT
Start: 2023-07-25

## 2023-07-25 RX ORDER — AMLODIPINE BESYLATE 5 MG/1
5 TABLET ORAL DAILY
Qty: 90 TABLET | Refills: 3 | Status: CANCELLED | OUTPATIENT
Start: 2023-07-25

## 2023-07-25 RX ORDER — INSULIN LISPRO 100 [IU]/ML
INJECTION, SUSPENSION SUBCUTANEOUS
Qty: 15 ADJUSTABLE DOSE PRE-FILLED PEN SYRINGE | Refills: 3 | Status: SHIPPED | OUTPATIENT
Start: 2023-07-25

## 2023-07-25 RX ORDER — HYDRALAZINE HYDROCHLORIDE 100 MG/1
TABLET, FILM COATED ORAL
COMMUNITY
Start: 2023-06-01

## 2023-07-25 NOTE — PROGRESS NOTES
2023    Assessment:       Diagnosis Orders   1. Type 2 diabetes mellitus with other specified complication, with long-term current use of insulin (HCC)  POCT Glucose    POCT glycosylated hemoglobin (Hb A1C)      2. Type 2 diabetes mellitus without complication, without long-term current use of insulin (HCC)              PLAN:     Orders Placed This Encounter   Procedures    Hemoglobin A1C     Standing Status:   Future     Standing Expiration Date:       Basic Metabolic Panel     Standing Status:   Future     Standing Expiration Date:   2024    POCT Glucose    POCT glycosylated hemoglobin (Hb A1C)     Continue current insulin advise compliance discussed continuous glucose monitoring Dexcom  A1c goal of 8 or lower    Orders Placed This Encounter   Medications    LANTUS SOLOSTAR 100 UNIT/ML injection pen     Si UNITS AT [id number] UNITS AT BEDTIME     Dispense:  15 Adjustable Dose Pre-filled Pen Syringe     Refill:  3    Insulin Pen Needle 32G X 6 MM MISC     Sig: Tid     Dispense:  100 each     Refill:  5    insulin lispro protamine & lispro (HUMALOG MIX 75/25 KWIKPEN) (75-25) 100 UNIT per ML SUPN injection pen     Si  units am 40 units lunch and 65 units dinner     Dispense:  15 Adjustable Dose Pre-filled Pen Syringe     Refill:  3    lisinopril (PRINIVIL;ZESTRIL) 20 MG tablet     Sig: Take 1 tablet by mouth daily     Dispense:  90 tablet     Refill:  1    Continuous Blood Gluc Sensor (DEXCOM G7 SENSOR) MISC     Si each by Does not apply route every 10 days E11.65     Dispense:  3 each     Refill:  3             Orders Placed This Encounter   Procedures    POCT Glucose    POCT glycosylated hemoglobin (Hb A1C)     No orders of the defined types were placed in this encounter. No follow-ups on file.   Subjective:     Chief Complaint   Patient presents with    Diabetes     Vitals:    23 1116 23 1123   BP: (!) 153/81 (!) 155/79   Pulse: 82    SpO2: 96%    Weight: 225 lb (102.1

## 2023-07-28 RX ORDER — ACYCLOVIR 400 MG/1
1 TABLET ORAL
Qty: 3 EACH | Refills: 3 | Status: SHIPPED | OUTPATIENT
Start: 2023-07-28

## 2024-04-25 DIAGNOSIS — Z79.4 TYPE 2 DIABETES MELLITUS WITH OTHER SPECIFIED COMPLICATION, WITH LONG-TERM CURRENT USE OF INSULIN (HCC): ICD-10-CM

## 2024-04-25 DIAGNOSIS — E11.69 TYPE 2 DIABETES MELLITUS WITH OTHER SPECIFIED COMPLICATION, WITH LONG-TERM CURRENT USE OF INSULIN (HCC): ICD-10-CM

## 2024-04-25 DIAGNOSIS — E11.9 TYPE 2 DIABETES MELLITUS WITHOUT COMPLICATION, WITHOUT LONG-TERM CURRENT USE OF INSULIN (HCC): ICD-10-CM

## 2024-04-25 RX ORDER — INSULIN GLARGINE 100 [IU]/ML
INJECTION, SOLUTION SUBCUTANEOUS
Qty: 15 ADJUSTABLE DOSE PRE-FILLED PEN SYRINGE | Refills: 1 | Status: SHIPPED | OUTPATIENT
Start: 2024-04-25

## 2024-04-25 RX ORDER — INSULIN LISPRO 100 [IU]/ML
INJECTION, SUSPENSION SUBCUTANEOUS
Qty: 15 ADJUSTABLE DOSE PRE-FILLED PEN SYRINGE | Refills: 1 | Status: SHIPPED | OUTPATIENT
Start: 2024-04-25

## 2024-04-30 ENCOUNTER — OFFICE VISIT (OUTPATIENT)
Dept: ENDOCRINOLOGY | Age: 69
End: 2024-04-30
Payer: MEDICARE

## 2024-04-30 VITALS
HEIGHT: 71 IN | HEART RATE: 80 BPM | SYSTOLIC BLOOD PRESSURE: 153 MMHG | DIASTOLIC BLOOD PRESSURE: 79 MMHG | WEIGHT: 220 LBS | BODY MASS INDEX: 30.8 KG/M2 | OXYGEN SATURATION: 97 %

## 2024-04-30 DIAGNOSIS — E11.65 INADEQUATELY CONTROLLED DIABETES MELLITUS (HCC): Primary | ICD-10-CM

## 2024-04-30 LAB
CHP ED QC CHECK: NORMAL
GLUCOSE BLD-MCNC: 142 MG/DL

## 2024-04-30 PROCEDURE — 3017F COLORECTAL CA SCREEN DOC REV: CPT | Performed by: INTERNAL MEDICINE

## 2024-04-30 PROCEDURE — 1036F TOBACCO NON-USER: CPT | Performed by: INTERNAL MEDICINE

## 2024-04-30 PROCEDURE — G8427 DOCREV CUR MEDS BY ELIG CLIN: HCPCS | Performed by: INTERNAL MEDICINE

## 2024-04-30 PROCEDURE — 3046F HEMOGLOBIN A1C LEVEL >9.0%: CPT | Performed by: INTERNAL MEDICINE

## 2024-04-30 PROCEDURE — 82962 GLUCOSE BLOOD TEST: CPT | Performed by: INTERNAL MEDICINE

## 2024-04-30 PROCEDURE — 1123F ACP DISCUSS/DSCN MKR DOCD: CPT | Performed by: INTERNAL MEDICINE

## 2024-04-30 PROCEDURE — 3078F DIAST BP <80 MM HG: CPT | Performed by: INTERNAL MEDICINE

## 2024-04-30 PROCEDURE — 3077F SYST BP >= 140 MM HG: CPT | Performed by: INTERNAL MEDICINE

## 2024-04-30 PROCEDURE — G8417 CALC BMI ABV UP PARAM F/U: HCPCS | Performed by: INTERNAL MEDICINE

## 2024-04-30 PROCEDURE — 2022F DILAT RTA XM EVC RTNOPTHY: CPT | Performed by: INTERNAL MEDICINE

## 2024-04-30 PROCEDURE — 99213 OFFICE O/P EST LOW 20 MIN: CPT | Performed by: INTERNAL MEDICINE

## 2024-04-30 RX ORDER — ACYCLOVIR 400 MG/1
1 TABLET ORAL
Qty: 3 EACH | Refills: 3 | Status: SHIPPED | OUTPATIENT
Start: 2024-04-30

## 2024-04-30 RX ORDER — GLUCOSAMINE HCL/CHONDROITIN SU 500-400 MG
CAPSULE ORAL
Qty: 100 STRIP | Refills: 3 | Status: SHIPPED | OUTPATIENT
Start: 2024-04-30

## 2024-04-30 RX ORDER — METHOCARBAMOL 500 MG/1
500 TABLET, FILM COATED ORAL 3 TIMES DAILY
COMMUNITY
Start: 2024-04-20

## 2024-04-30 RX ORDER — LANCETS 30 GAUGE
1 EACH MISCELLANEOUS DAILY
Qty: 100 EACH | Refills: 3 | Status: SHIPPED | OUTPATIENT
Start: 2024-04-30

## 2024-04-30 NOTE — PROGRESS NOTES
LANTUS SOLOSTAR 100 UNIT/ML injection pen, 40 UNITS AT [id number] UNITS AT BEDTIME, Disp: 15 Adjustable Dose Pre-filled Pen Syringe, Rfl: 1    hydrALAZINE (APRESOLINE) 100 MG tablet, TAKE 1 TABLET BY MOUTH TWICE DAILY AS DIRECTED, Disp: , Rfl:     mupirocin (BACTROBAN) 2 % ointment, mupirocin (Bactroban) 2 % ointment, Disp: , Rfl:     Insulin Pen Needle 32G X 6 MM MISC, Tid, Disp: 100 each, Rfl: 5    lisinopril (PRINIVIL;ZESTRIL) 20 MG tablet, Take 1 tablet by mouth daily, Disp: 90 tablet, Rfl: 1    Cyanocobalamin (B-12) 1000 MCG TBCR, TAKE 1 TABLET BY MOUTH WITH BREAKFAST AND 1 TABLET WITH LUNCH. DO NOT TAKE WITH SUPPER., Disp: , Rfl:     atorvastatin (LIPITOR) 40 MG tablet, Take 1 tablet by mouth daily, Disp: 30 tablet, Rfl: 3    SURE COMFORT PEN NEEDLES 31G X 5 MM MISC, USE AS DIRECTED TO INJECT insulin, Disp: 200 each, Rfl: 3    amLODIPine (NORVASC) 5 MG tablet, Take 1 tablet by mouth daily, Disp: 90 tablet, Rfl: 3    chlorthalidone (HYGROTON) 25 MG tablet, Take 1 tablet by mouth daily, Disp: 30 tablet, Rfl: 3    Insulin Pen Needle (NOVOFINE) 32G X 6 MM MISC, Qid updated, Disp: 300 each, Rfl: 3    vitamin D (ERGOCALCIFEROL) 1.25 MG (07494 UT) CAPS capsule, , Disp: , Rfl:     metoprolol succinate (TOPROL XL) 50 MG extended release tablet, TK 1 T PO D WITH SUPPER, Disp: , Rfl:     omega-3 acid ethyl esters (LOVAZA) 1 g capsule, , Disp: , Rfl:     potassium chloride (KLOR-CON) 10 MEQ extended release tablet, TK 1 T PO QD WITH TATA, Disp: , Rfl:     aspirin 81 MG tablet, Take by mouth, Disp: , Rfl:     Alcohol Swabs (ALCOHOL PREP) 70 % PADS, , Disp: , Rfl:     Multiple Vitamin (MVI, CELEBRATE, CHEWABLE TABLET), Take 1 tablet by mouth daily, Disp: 30 tablet, Rfl: 06  Lab Results   Component Value Date     09/08/2023    K 4.3 09/08/2023     09/08/2023    CO2 25 09/08/2023    BUN 27 (H) 09/08/2023    CREATININE 1.13 09/08/2023    GLUCOSE 249 (H) 09/08/2023    CALCIUM 9.5 09/08/2023    PROT 7.8 09/08/2023

## 2024-07-30 ENCOUNTER — OFFICE VISIT (OUTPATIENT)
Dept: ENDOCRINOLOGY | Age: 69
End: 2024-07-30
Payer: MEDICARE

## 2024-07-30 VITALS
BODY MASS INDEX: 30.8 KG/M2 | DIASTOLIC BLOOD PRESSURE: 81 MMHG | WEIGHT: 220 LBS | HEIGHT: 71 IN | HEART RATE: 76 BPM | OXYGEN SATURATION: 96 % | SYSTOLIC BLOOD PRESSURE: 151 MMHG

## 2024-07-30 DIAGNOSIS — N18.30 STAGE 3 CHRONIC KIDNEY DISEASE, UNSPECIFIED WHETHER STAGE 3A OR 3B CKD (HCC): Primary | ICD-10-CM

## 2024-07-30 DIAGNOSIS — Z79.4 TYPE 2 DIABETES MELLITUS WITH OTHER SPECIFIED COMPLICATION, WITH LONG-TERM CURRENT USE OF INSULIN (HCC): ICD-10-CM

## 2024-07-30 DIAGNOSIS — E11.69 TYPE 2 DIABETES MELLITUS WITH OTHER SPECIFIED COMPLICATION, WITH LONG-TERM CURRENT USE OF INSULIN (HCC): ICD-10-CM

## 2024-07-30 LAB
CHP ED QC CHECK: ABNORMAL
GLUCOSE BLD-MCNC: 190 MG/DL

## 2024-07-30 PROCEDURE — G8417 CALC BMI ABV UP PARAM F/U: HCPCS | Performed by: INTERNAL MEDICINE

## 2024-07-30 PROCEDURE — 3077F SYST BP >= 140 MM HG: CPT | Performed by: INTERNAL MEDICINE

## 2024-07-30 PROCEDURE — 3078F DIAST BP <80 MM HG: CPT | Performed by: INTERNAL MEDICINE

## 2024-07-30 PROCEDURE — 3017F COLORECTAL CA SCREEN DOC REV: CPT | Performed by: INTERNAL MEDICINE

## 2024-07-30 PROCEDURE — 2022F DILAT RTA XM EVC RTNOPTHY: CPT | Performed by: INTERNAL MEDICINE

## 2024-07-30 PROCEDURE — 3046F HEMOGLOBIN A1C LEVEL >9.0%: CPT | Performed by: INTERNAL MEDICINE

## 2024-07-30 PROCEDURE — 82962 GLUCOSE BLOOD TEST: CPT | Performed by: INTERNAL MEDICINE

## 2024-07-30 PROCEDURE — 99213 OFFICE O/P EST LOW 20 MIN: CPT | Performed by: INTERNAL MEDICINE

## 2024-07-30 PROCEDURE — 1036F TOBACCO NON-USER: CPT | Performed by: INTERNAL MEDICINE

## 2024-07-30 PROCEDURE — G8427 DOCREV CUR MEDS BY ELIG CLIN: HCPCS | Performed by: INTERNAL MEDICINE

## 2024-07-30 PROCEDURE — 1123F ACP DISCUSS/DSCN MKR DOCD: CPT | Performed by: INTERNAL MEDICINE

## 2024-07-30 RX ORDER — AMLODIPINE BESYLATE 10 MG/1
TABLET ORAL
COMMUNITY
Start: 2024-04-26

## 2024-07-30 RX ORDER — INSULIN LISPRO 100 [IU]/ML
INJECTION, SUSPENSION SUBCUTANEOUS
Qty: 15 ADJUSTABLE DOSE PRE-FILLED PEN SYRINGE | Refills: 1 | Status: SHIPPED | OUTPATIENT
Start: 2024-07-30

## 2024-07-30 RX ORDER — INSULIN GLARGINE 100 [IU]/ML
INJECTION, SOLUTION SUBCUTANEOUS
Qty: 15 ADJUSTABLE DOSE PRE-FILLED PEN SYRINGE | Refills: 1 | Status: CANCELLED | OUTPATIENT
Start: 2024-07-30

## 2024-07-30 ASSESSMENT — ENCOUNTER SYMPTOMS: EYES NEGATIVE: 1

## 2024-07-30 NOTE — PROGRESS NOTES
2024    Assessment:       Diagnosis Orders   1. Stage 3 chronic kidney disease, unspecified whether stage 3a or 3b CKD ([facility])        2. Type 2 diabetes mellitus with other specified complication, with long-term current use of insulin ([facility])            PLAN:     Orders Placed This Encounter   Procedures    Hemoglobin A1C     Standing Status:   Future     Standing Expiration Date:   2025    Basic Metabolic Panel     Standing Status:   Future     Standing Expiration Date:   2025    POCT Glucose     Orders Placed This Encounter   Medications    insulin lispro protamine & lispro (HUMALOG MIX 75/25 KWIKPEN) (75-25) 100 UNIT per ML SUPN injection pen     Si  units am  and 65 units dinner     Dispense:  15 Adjustable Dose Pre-filled Pen Syringe     Refill:  1    Insulin Pen Needle 32G X 6 MM MISC     Sig: bid     Dispense:  100 each     Refill:  3     Continue current dose of 75/25 Humalog twice daily discontinue Lantus patient to follow-up with compliance stressed with diet A1c goal of less than 7    Orders Placed This Encounter   Procedures    POCT Glucose     No orders of the defined types were placed in this encounter.    No follow-ups on file.  Subjective:     Chief Complaint   Patient presents with    Diabetes     Vitals:    24 0916 24 0922   BP: (!) 152/75 (!) 151/81   Pulse: 76    SpO2: 96%    Weight: 99.8 kg (220 lb)    Height: 1.803 m (5' 10.98\")      Wt Readings from Last 3 Encounters:   24 99.8 kg (220 lb)   24 99.8 kg (220 lb)   23 102.1 kg (225 lb)     BP Readings from Last 3 Encounters:   24 (!) 151/81   24 (!) 153/79   23 (!) 155/79     Follow-up on type 2 diabetes obesity history of chronic kidney disease patient on 75/25 Humalog insulin 65 units twice daily not taking Lantus labs were reviewed recently A1c was 6.9 chronic kidney disease patient accompanied by caregiver today variable compliance with testing diet insulin    Diabetes  He

## 2024-10-31 ENCOUNTER — OFFICE VISIT (OUTPATIENT)
Dept: ENDOCRINOLOGY | Age: 69
End: 2024-10-31
Payer: MEDICARE

## 2024-10-31 VITALS
DIASTOLIC BLOOD PRESSURE: 84 MMHG | WEIGHT: 272 LBS | SYSTOLIC BLOOD PRESSURE: 161 MMHG | HEART RATE: 77 BPM | BODY MASS INDEX: 38.08 KG/M2 | OXYGEN SATURATION: 96 % | HEIGHT: 71 IN

## 2024-10-31 DIAGNOSIS — Z79.4 TYPE 2 DIABETES MELLITUS WITH OTHER SPECIFIED COMPLICATION, WITH LONG-TERM CURRENT USE OF INSULIN (HCC): Primary | ICD-10-CM

## 2024-10-31 DIAGNOSIS — E11.69 TYPE 2 DIABETES MELLITUS WITH OTHER SPECIFIED COMPLICATION, WITH LONG-TERM CURRENT USE OF INSULIN (HCC): Primary | ICD-10-CM

## 2024-10-31 LAB
CHP ED QC CHECK: NORMAL
GLUCOSE BLD-MCNC: 122 MG/DL
HBA1C MFR BLD: 6.9 %

## 2024-10-31 PROCEDURE — 82962 GLUCOSE BLOOD TEST: CPT | Performed by: INTERNAL MEDICINE

## 2024-10-31 PROCEDURE — 3044F HG A1C LEVEL LT 7.0%: CPT | Performed by: INTERNAL MEDICINE

## 2024-10-31 PROCEDURE — 3077F SYST BP >= 140 MM HG: CPT | Performed by: INTERNAL MEDICINE

## 2024-10-31 PROCEDURE — 3017F COLORECTAL CA SCREEN DOC REV: CPT | Performed by: INTERNAL MEDICINE

## 2024-10-31 PROCEDURE — G8484 FLU IMMUNIZE NO ADMIN: HCPCS | Performed by: INTERNAL MEDICINE

## 2024-10-31 PROCEDURE — 1123F ACP DISCUSS/DSCN MKR DOCD: CPT | Performed by: INTERNAL MEDICINE

## 2024-10-31 PROCEDURE — 83036 HEMOGLOBIN GLYCOSYLATED A1C: CPT | Performed by: INTERNAL MEDICINE

## 2024-10-31 PROCEDURE — G8417 CALC BMI ABV UP PARAM F/U: HCPCS | Performed by: INTERNAL MEDICINE

## 2024-10-31 PROCEDURE — 1036F TOBACCO NON-USER: CPT | Performed by: INTERNAL MEDICINE

## 2024-10-31 PROCEDURE — 99213 OFFICE O/P EST LOW 20 MIN: CPT | Performed by: INTERNAL MEDICINE

## 2024-10-31 PROCEDURE — 1159F MED LIST DOCD IN RCRD: CPT | Performed by: INTERNAL MEDICINE

## 2024-10-31 PROCEDURE — G8427 DOCREV CUR MEDS BY ELIG CLIN: HCPCS | Performed by: INTERNAL MEDICINE

## 2024-10-31 PROCEDURE — 2022F DILAT RTA XM EVC RTNOPTHY: CPT | Performed by: INTERNAL MEDICINE

## 2024-10-31 PROCEDURE — 3079F DIAST BP 80-89 MM HG: CPT | Performed by: INTERNAL MEDICINE

## 2024-10-31 NOTE — PROGRESS NOTES
10/31/2024    Assessment:       Diagnosis Orders   1. Type 2 diabetes mellitus with other specified complication, with long-term current use of insulin ([facility])  POCT Glucose    POCT glycosylated hemoglobin (Hb A1C)            PLAN:       Orders Placed This Encounter   Procedures    Basic Metabolic Panel     Standing Status:   Future     Standing Expiration Date:   10/31/2025    Hemoglobin A1C     Standing Status:   Future     Standing Expiration Date:   10/31/2025    POCT Glucose    POCT glycosylated hemoglobin (Hb A1C)     DIABETES FOOT EXAM     continue patient on current insulin regimen follow-up in 3 to 6 months time    Orders Placed This Encounter   Procedures    POCT Glucose    POCT glycosylated hemoglobin (Hb A1C)     No orders of the defined types were placed in this encounter.    No follow-ups on file.  Subjective:     Chief Complaint   Patient presents with    Diabetes    CGM     Dexcom g7 , left  at home       Vitals:    10/31/24 0913   BP: (!) 161/84   Pulse: 77   SpO2: 96%   Weight: 123.4 kg (272 lb)   Height: 1.803 m (5' 10.98\")     Wt Readings from Last 3 Encounters:   10/31/24 123.4 kg (272 lb)   07/30/24 99.8 kg (220 lb)   04/30/24 99.8 kg (220 lb)     BP Readings from Last 3 Encounters:   10/31/24 (!) 161/84   07/30/24 (!) 151/81   04/30/24 (!) 153/79       Follow-up on type 2 diabetes obesity patient's hemoglobin A1c has improved to 6.9 currently on 75/25 Humalog 65 units twice daily testing blood sugar twice daily obesity BMI 37  History of chronic kidney disease   Patient using Dexcom left  at home  Hemoglobin A1C       Date                     Value               Ref Range           Status                10/31/2024               6.9                 %                   Final            ----------      Diabetes  He presents for his follow-up diabetic visit. He has type 2 diabetes mellitus. Symptoms are stable. Diabetic complications include nephropathy. Risk factors for coronary

## 2024-12-20 ENCOUNTER — TELEPHONE (OUTPATIENT)
Dept: ENDOCRINOLOGY | Age: 69
End: 2024-12-20

## 2024-12-20 NOTE — TELEPHONE ENCOUNTER
CALLED NUMBER ON FILE TO RESCHEDULE FEB 4TH APPT. WHOM EVER ANSWERED THE PHONE SAID I HAD THE WRONG NUMBER AND TO TAKE THIS NUMBER OFF OUR LIST.

## 2025-02-24 ENCOUNTER — OFFICE VISIT (OUTPATIENT)
Dept: ENDOCRINOLOGY | Age: 70
End: 2025-02-24
Payer: MEDICARE

## 2025-02-24 VITALS
HEART RATE: 86 BPM | WEIGHT: 218.26 LBS | SYSTOLIC BLOOD PRESSURE: 133 MMHG | DIASTOLIC BLOOD PRESSURE: 63 MMHG | BODY MASS INDEX: 30.56 KG/M2 | HEIGHT: 71 IN

## 2025-02-24 DIAGNOSIS — E11.69 TYPE 2 DIABETES MELLITUS WITH OTHER SPECIFIED COMPLICATION, WITH LONG-TERM CURRENT USE OF INSULIN (HCC): Primary | ICD-10-CM

## 2025-02-24 DIAGNOSIS — Z79.4 TYPE 2 DIABETES MELLITUS WITH OTHER SPECIFIED COMPLICATION, WITH LONG-TERM CURRENT USE OF INSULIN (HCC): Primary | ICD-10-CM

## 2025-02-24 LAB
CHP ED QC CHECK: ABNORMAL
GLUCOSE BLD-MCNC: 238 MG/DL

## 2025-02-24 PROCEDURE — G8417 CALC BMI ABV UP PARAM F/U: HCPCS | Performed by: INTERNAL MEDICINE

## 2025-02-24 PROCEDURE — 1036F TOBACCO NON-USER: CPT | Performed by: INTERNAL MEDICINE

## 2025-02-24 PROCEDURE — 3078F DIAST BP <80 MM HG: CPT | Performed by: INTERNAL MEDICINE

## 2025-02-24 PROCEDURE — 3046F HEMOGLOBIN A1C LEVEL >9.0%: CPT | Performed by: INTERNAL MEDICINE

## 2025-02-24 PROCEDURE — 1123F ACP DISCUSS/DSCN MKR DOCD: CPT | Performed by: INTERNAL MEDICINE

## 2025-02-24 PROCEDURE — 1126F AMNT PAIN NOTED NONE PRSNT: CPT | Performed by: INTERNAL MEDICINE

## 2025-02-24 PROCEDURE — 82962 GLUCOSE BLOOD TEST: CPT | Performed by: INTERNAL MEDICINE

## 2025-02-24 PROCEDURE — 2022F DILAT RTA XM EVC RTNOPTHY: CPT | Performed by: INTERNAL MEDICINE

## 2025-02-24 PROCEDURE — 99213 OFFICE O/P EST LOW 20 MIN: CPT | Performed by: INTERNAL MEDICINE

## 2025-02-24 PROCEDURE — 3075F SYST BP GE 130 - 139MM HG: CPT | Performed by: INTERNAL MEDICINE

## 2025-02-24 PROCEDURE — 1159F MED LIST DOCD IN RCRD: CPT | Performed by: INTERNAL MEDICINE

## 2025-02-24 PROCEDURE — G8427 DOCREV CUR MEDS BY ELIG CLIN: HCPCS | Performed by: INTERNAL MEDICINE

## 2025-02-24 PROCEDURE — 3017F COLORECTAL CA SCREEN DOC REV: CPT | Performed by: INTERNAL MEDICINE

## 2025-02-24 RX ORDER — INSULIN LISPRO 100 [IU]/ML
INJECTION, SUSPENSION SUBCUTANEOUS
Qty: 15 ADJUSTABLE DOSE PRE-FILLED PEN SYRINGE | Refills: 3 | Status: SHIPPED | OUTPATIENT
Start: 2025-02-24

## 2025-02-24 ASSESSMENT — ENCOUNTER SYMPTOMS: EYES NEGATIVE: 1

## 2025-02-24 NOTE — PROGRESS NOTES
2025    Assessment:       Diagnosis Orders   1. Type 2 diabetes mellitus with other specified complication, with long-term current use of insulin (HCC)  POCT Glucose            PLAN:     Orders Placed This Encounter   Procedures    Hemoglobin A1C     Standing Status:   Future     Standing Expiration Date:   2026    Basic Metabolic Panel     Standing Status:   Future     Standing Expiration Date:   2026    POCT Glucose     Orders Placed This Encounter   Medications    insulin lispro protamine & lispro (HUMALOG MIX 75/25 KWIKPEN) (75-25) 100 UNIT per ML SUPN injection pen     Si  units am  and 65 units dinner     Dispense:  15 Adjustable Dose Pre-filled Pen Syringe     Refill:  3    Insulin Pen Needle 32G X 6 MM MISC     Sig: bid     Dispense:  100 each     Refill:  3     Patient education done regarding hypoglycemia to not take insulin if he is does not want to eat follow-up in 3 to 6 months time A1c goal of less than 7  Diabetes education provided today:    Nutrition as a mainstream of diabetes therapy. Terry about label reading.  Managing high and low sugar readings.      Orders Placed This Encounter   Procedures    POCT Glucose     No orders of the defined types were placed in this encounter.    No follow-ups on file.  Subjective:     Chief Complaint   Patient presents with    Diabetes    CGM     Left meter at home     Vitals:    25 0933   BP: 133/63   Pulse: 86   Weight: 99 kg (218 lb 4.1 oz)   Height: 1.803 m (5' 10.98\")     Wt Readings from Last 3 Encounters:   25 99 kg (218 lb 4.1 oz)   10/31/24 123.4 kg (272 lb)   24 99.8 kg (220 lb)     BP Readings from Last 3 Encounters:   25 133/63   10/31/24 (!) 161/84   24 (!) 151/81       Follow-up on type 2 diabetes obesity on 65 units twice a day of 75/25 Humalog hide 1 episode of hypoglycemia requiring ambulance because of patient not eating after taking insulin history of chronic kidney disease last hemoglobin A1c

## 2025-06-26 ENCOUNTER — OFFICE VISIT (OUTPATIENT)
Age: 70
End: 2025-06-26
Payer: MEDICARE

## 2025-06-26 VITALS
WEIGHT: 220.46 LBS | HEART RATE: 79 BPM | DIASTOLIC BLOOD PRESSURE: 75 MMHG | BODY MASS INDEX: 30.76 KG/M2 | SYSTOLIC BLOOD PRESSURE: 126 MMHG

## 2025-06-26 DIAGNOSIS — E11.69 TYPE 2 DIABETES MELLITUS WITH OTHER SPECIFIED COMPLICATION, WITH LONG-TERM CURRENT USE OF INSULIN (HCC): Primary | ICD-10-CM

## 2025-06-26 DIAGNOSIS — Z79.4 TYPE 2 DIABETES MELLITUS WITH OTHER SPECIFIED COMPLICATION, WITH LONG-TERM CURRENT USE OF INSULIN (HCC): Primary | ICD-10-CM

## 2025-06-26 LAB
CHP ED QC CHECK: ABNORMAL
GLUCOSE BLD-MCNC: 227 MG/DL
HBA1C MFR BLD: 8.1 %

## 2025-06-26 PROCEDURE — 3052F HG A1C>EQUAL 8.0%<EQUAL 9.0%: CPT | Performed by: INTERNAL MEDICINE

## 2025-06-26 PROCEDURE — 3078F DIAST BP <80 MM HG: CPT | Performed by: INTERNAL MEDICINE

## 2025-06-26 PROCEDURE — 83036 HEMOGLOBIN GLYCOSYLATED A1C: CPT | Performed by: INTERNAL MEDICINE

## 2025-06-26 PROCEDURE — 1159F MED LIST DOCD IN RCRD: CPT | Performed by: INTERNAL MEDICINE

## 2025-06-26 PROCEDURE — 3074F SYST BP LT 130 MM HG: CPT | Performed by: INTERNAL MEDICINE

## 2025-06-26 PROCEDURE — 1036F TOBACCO NON-USER: CPT | Performed by: INTERNAL MEDICINE

## 2025-06-26 PROCEDURE — G8427 DOCREV CUR MEDS BY ELIG CLIN: HCPCS | Performed by: INTERNAL MEDICINE

## 2025-06-26 PROCEDURE — 3017F COLORECTAL CA SCREEN DOC REV: CPT | Performed by: INTERNAL MEDICINE

## 2025-06-26 PROCEDURE — 1126F AMNT PAIN NOTED NONE PRSNT: CPT | Performed by: INTERNAL MEDICINE

## 2025-06-26 PROCEDURE — 2022F DILAT RTA XM EVC RTNOPTHY: CPT | Performed by: INTERNAL MEDICINE

## 2025-06-26 PROCEDURE — 99213 OFFICE O/P EST LOW 20 MIN: CPT | Performed by: INTERNAL MEDICINE

## 2025-06-26 PROCEDURE — G8417 CALC BMI ABV UP PARAM F/U: HCPCS | Performed by: INTERNAL MEDICINE

## 2025-06-26 PROCEDURE — 1123F ACP DISCUSS/DSCN MKR DOCD: CPT | Performed by: INTERNAL MEDICINE

## 2025-06-26 PROCEDURE — 82962 GLUCOSE BLOOD TEST: CPT | Performed by: INTERNAL MEDICINE

## 2025-06-26 RX ORDER — INSULIN LISPRO 100 [IU]/ML
INJECTION, SUSPENSION SUBCUTANEOUS
Qty: 15 ADJUSTABLE DOSE PRE-FILLED PEN SYRINGE | Refills: 3 | Status: SHIPPED | OUTPATIENT
Start: 2025-06-26

## 2025-06-26 RX ORDER — ACYCLOVIR 400 MG/1
TABLET ORAL
Qty: 3 EACH | Refills: 5 | Status: SHIPPED | OUTPATIENT
Start: 2025-06-26

## 2025-06-26 RX ORDER — CHLORTHALIDONE 50 MG/1
50 TABLET ORAL DAILY
COMMUNITY
Start: 2025-06-11

## 2025-06-26 RX ORDER — ACYCLOVIR 400 MG/1
TABLET ORAL
Qty: 1 EACH | Refills: 0 | Status: SHIPPED | OUTPATIENT
Start: 2025-06-26

## 2025-06-26 NOTE — PROGRESS NOTES
2025    Assessment:       Diagnosis Orders   1. Type 2 diabetes mellitus with other specified complication, with long-term current use of insulin (HCC)  POCT Glucose    POCT glycosylated hemoglobin (Hb A1C)            PLAN:     Orders Placed This Encounter   Procedures    Basic Metabolic Panel     Standing Status:   Future     Expected Date:   2025     Expiration Date:   2026    Hemoglobin A1C     Standing Status:   Future     Expected Date:   2025     Expiration Date:   2026    Lipid Panel     Standing Status:   Future     Expected Date:   2025     Expiration Date:   2026    Albumin/Creatinine Ratio, Urine     Standing Status:   Future     Expected Date:   2025     Expiration Date:   2026    POCT Glucose    POCT glycosylated hemoglobin (Hb A1C)     Orders Placed This Encounter   Medications    insulin lispro protamine & lispro (HUMALOG MIX 75/25 KWIKPEN) (75-25) 100 UNIT per ML SUPN injection pen     Si  units am  and 65 units dinner     Dispense:  15 Adjustable Dose Pre-filled Pen Syringe     Refill:  3    Insulin Pen Needle 32G X 6 MM MISC     Sig: bid     Dispense:  100 each     Refill:  3    Continuous Glucose Sensor (DEXCOM G7 SENSOR) MISC     Sig: Change sensor every 10 days     Dispense:  3 each     Refill:  5    Continuous Glucose  (DEXCOM G7 ) DANGELO     Sig: Glucose      Dispense:  1 each     Refill:  0    Ostomy Supplies (SKIN TAC ADHESIVE BARRIER WIPE) MISC     Sig: Use as directed for the Dexcom sensor     Dispense:  100 each     Refill:  3     Continue current insulin regimen prescription also given for Dexcom also use adhesive patches A1c goal of less than 7 follow-up in 3 months        Orders Placed This Encounter   Procedures    POCT Glucose    POCT glycosylated hemoglobin (Hb A1C)     No orders of the defined types were placed in this encounter.    No follow-ups on file.  Subjective:     Chief Complaint   Patient presents with